# Patient Record
Sex: MALE | Race: WHITE | Employment: OTHER | URBAN - NONMETROPOLITAN AREA
[De-identification: names, ages, dates, MRNs, and addresses within clinical notes are randomized per-mention and may not be internally consistent; named-entity substitution may affect disease eponyms.]

---

## 2017-06-14 ENCOUNTER — OFFICE VISIT (OUTPATIENT)
Dept: CARDIOLOGY CLINIC | Age: 82
End: 2017-06-14

## 2017-06-14 VITALS
HEART RATE: 80 BPM | OXYGEN SATURATION: 97 % | DIASTOLIC BLOOD PRESSURE: 82 MMHG | SYSTOLIC BLOOD PRESSURE: 114 MMHG | WEIGHT: 169 LBS | BODY MASS INDEX: 25.03 KG/M2 | HEIGHT: 69 IN

## 2017-06-14 DIAGNOSIS — I48.19 PERSISTENT ATRIAL FIBRILLATION (HCC): ICD-10-CM

## 2017-06-14 DIAGNOSIS — E78.2 MIXED HYPERLIPIDEMIA: Primary | ICD-10-CM

## 2017-06-14 PROCEDURE — 99214 OFFICE O/P EST MOD 30 MIN: CPT | Performed by: INTERNAL MEDICINE

## 2017-08-30 ENCOUNTER — OFFICE VISIT (OUTPATIENT)
Dept: FAMILY MEDICINE CLINIC | Age: 82
End: 2017-08-30

## 2017-08-30 VITALS
DIASTOLIC BLOOD PRESSURE: 66 MMHG | HEIGHT: 65 IN | OXYGEN SATURATION: 97 % | HEART RATE: 60 BPM | WEIGHT: 173.2 LBS | BODY MASS INDEX: 28.86 KG/M2 | SYSTOLIC BLOOD PRESSURE: 110 MMHG

## 2017-08-30 DIAGNOSIS — R73.9 HYPERGLYCEMIA: ICD-10-CM

## 2017-08-30 DIAGNOSIS — R31.9 HEMATURIA: ICD-10-CM

## 2017-08-30 DIAGNOSIS — Z23 NEED FOR PROPHYLACTIC VACCINATION AND INOCULATION AGAINST VARICELLA: ICD-10-CM

## 2017-08-30 DIAGNOSIS — I48.20 CHRONIC ATRIAL FIBRILLATION (HCC): ICD-10-CM

## 2017-08-30 DIAGNOSIS — E78.2 MIXED HYPERLIPIDEMIA: ICD-10-CM

## 2017-08-30 DIAGNOSIS — R82.998 DARK URINE: ICD-10-CM

## 2017-08-30 DIAGNOSIS — I87.2 VENOUS INSUFFICIENCY: ICD-10-CM

## 2017-08-30 DIAGNOSIS — C61 PROSTATE CANCER (HCC): ICD-10-CM

## 2017-08-30 DIAGNOSIS — R60.0 EDEMA OF BOTH LEGS: Primary | ICD-10-CM

## 2017-08-30 DIAGNOSIS — Z23 NEED FOR INFLUENZA VACCINATION: ICD-10-CM

## 2017-08-30 DIAGNOSIS — T14.8XXA BRUISING: ICD-10-CM

## 2017-08-30 DIAGNOSIS — Z23 NEED FOR PROPHYLACTIC VACCINATION AGAINST DIPHTHERIA-TETANUS-PERTUSSIS (DTP): ICD-10-CM

## 2017-08-30 DIAGNOSIS — Z23 NEED FOR PROPHYLACTIC VACCINATION AGAINST STREPTOCOCCUS PNEUMONIAE (PNEUMOCOCCUS): ICD-10-CM

## 2017-08-30 DIAGNOSIS — E55.9 VITAMIN D DEFICIENCY: ICD-10-CM

## 2017-08-30 LAB
A/G RATIO: 1.7 (ref 1.1–2.2)
ALBUMIN SERPL-MCNC: 4.1 G/DL (ref 3.4–5)
ALP BLD-CCNC: 55 U/L (ref 40–129)
ALT SERPL-CCNC: 13 U/L (ref 10–40)
ANION GAP SERPL CALCULATED.3IONS-SCNC: 13 MMOL/L (ref 3–16)
AST SERPL-CCNC: 20 U/L (ref 15–37)
BASOPHILS ABSOLUTE: 0 K/UL (ref 0–0.2)
BASOPHILS RELATIVE PERCENT: 0.7 %
BILIRUB SERPL-MCNC: 0.7 MG/DL (ref 0–1)
BILIRUBIN, POC: NEGATIVE
BLOOD URINE, POC: NORMAL
BUN BLDV-MCNC: 18 MG/DL (ref 7–20)
CALCIUM SERPL-MCNC: 9.6 MG/DL (ref 8.3–10.6)
CHLORIDE BLD-SCNC: 102 MMOL/L (ref 99–110)
CLARITY, POC: CLEAR
CO2: 27 MMOL/L (ref 21–32)
COLOR, POC: YELLOW
CREAT SERPL-MCNC: 0.9 MG/DL (ref 0.8–1.3)
EOSINOPHILS ABSOLUTE: 0.2 K/UL (ref 0–0.6)
EOSINOPHILS RELATIVE PERCENT: 3.1 %
GFR AFRICAN AMERICAN: >60
GFR NON-AFRICAN AMERICAN: >60
GLOBULIN: 2.4 G/DL
GLUCOSE BLD-MCNC: 94 MG/DL (ref 70–99)
GLUCOSE URINE, POC: NEGATIVE
HCT VFR BLD CALC: 37.6 % (ref 40.5–52.5)
HEMOGLOBIN: 12.7 G/DL (ref 13.5–17.5)
KETONES, POC: NEGATIVE
LEUKOCYTE EST, POC: NEGATIVE
LYMPHOCYTES ABSOLUTE: 1.8 K/UL (ref 1–5.1)
LYMPHOCYTES RELATIVE PERCENT: 27.5 %
MCH RBC QN AUTO: 31.3 PG (ref 26–34)
MCHC RBC AUTO-ENTMCNC: 33.8 G/DL (ref 31–36)
MCV RBC AUTO: 92.6 FL (ref 80–100)
MONOCYTES ABSOLUTE: 0.5 K/UL (ref 0–1.3)
MONOCYTES RELATIVE PERCENT: 7.2 %
NEUTROPHILS ABSOLUTE: 3.9 K/UL (ref 1.7–7.7)
NEUTROPHILS RELATIVE PERCENT: 61.5 %
NITRITE, POC: NEGATIVE
PDW BLD-RTO: 14.2 % (ref 12.4–15.4)
PH, POC: 7
PLATELET # BLD: 175 K/UL (ref 135–450)
PMV BLD AUTO: 8 FL (ref 5–10.5)
POTASSIUM SERPL-SCNC: 4.1 MMOL/L (ref 3.5–5.1)
PROTEIN, POC: NEGATIVE
RBC # BLD: 4.06 M/UL (ref 4.2–5.9)
SODIUM BLD-SCNC: 142 MMOL/L (ref 136–145)
SPECIFIC GRAVITY, POC: 1.02
TOTAL PROTEIN: 6.5 G/DL (ref 6.4–8.2)
UROBILINOGEN, POC: 1
VITAMIN D 25-HYDROXY: 30 NG/ML
WBC # BLD: 6.4 K/UL (ref 4–11)

## 2017-08-30 PROCEDURE — 3288F FALL RISK ASSESSMENT DOCD: CPT | Performed by: FAMILY MEDICINE

## 2017-08-30 PROCEDURE — 99214 OFFICE O/P EST MOD 30 MIN: CPT | Performed by: FAMILY MEDICINE

## 2017-08-30 PROCEDURE — 36415 COLL VENOUS BLD VENIPUNCTURE: CPT | Performed by: FAMILY MEDICINE

## 2017-08-30 PROCEDURE — 81002 URINALYSIS NONAUTO W/O SCOPE: CPT | Performed by: FAMILY MEDICINE

## 2017-08-30 PROCEDURE — G8510 SCR DEP NEG, NO PLAN REQD: HCPCS | Performed by: FAMILY MEDICINE

## 2017-08-30 RX ORDER — DORZOLAMIDE HCL 20 MG/ML
SOLUTION/ DROPS OPHTHALMIC
COMMUNITY
Start: 2017-08-27 | End: 2018-08-30

## 2017-08-30 RX ORDER — LATANOPROST 50 UG/ML
SOLUTION/ DROPS OPHTHALMIC
COMMUNITY
Start: 2017-08-08 | End: 2018-08-30

## 2017-08-30 ASSESSMENT — PATIENT HEALTH QUESTIONNAIRE - PHQ9
SUM OF ALL RESPONSES TO PHQ9 QUESTIONS 1 & 2: 0
2. FEELING DOWN, DEPRESSED OR HOPELESS: 0
1. LITTLE INTEREST OR PLEASURE IN DOING THINGS: 0
SUM OF ALL RESPONSES TO PHQ QUESTIONS 1-9: 0

## 2017-08-31 LAB
ESTIMATED AVERAGE GLUCOSE: 111.2 MG/DL
HBA1C MFR BLD: 5.5 %

## 2017-09-01 LAB — URINE CULTURE, ROUTINE: NORMAL

## 2018-05-30 ENCOUNTER — TELEPHONE (OUTPATIENT)
Dept: FAMILY MEDICINE CLINIC | Age: 83
End: 2018-05-30

## 2018-06-27 ENCOUNTER — TELEPHONE (OUTPATIENT)
Dept: FAMILY MEDICINE CLINIC | Age: 83
End: 2018-06-27

## 2018-07-05 ENCOUNTER — OFFICE VISIT (OUTPATIENT)
Dept: FAMILY MEDICINE CLINIC | Age: 83
End: 2018-07-05

## 2018-07-05 VITALS
OXYGEN SATURATION: 96 % | SYSTOLIC BLOOD PRESSURE: 130 MMHG | BODY MASS INDEX: 27.49 KG/M2 | HEIGHT: 65 IN | DIASTOLIC BLOOD PRESSURE: 74 MMHG | WEIGHT: 165 LBS | HEART RATE: 108 BPM

## 2018-07-05 DIAGNOSIS — I48.20 CHRONIC ATRIAL FIBRILLATION (HCC): ICD-10-CM

## 2018-07-05 DIAGNOSIS — H40.9 GLAUCOMA OF LEFT EYE, UNSPECIFIED GLAUCOMA TYPE: ICD-10-CM

## 2018-07-05 DIAGNOSIS — Z01.818 PREOPERATIVE EXAMINATION: Primary | ICD-10-CM

## 2018-07-05 PROCEDURE — 99213 OFFICE O/P EST LOW 20 MIN: CPT | Performed by: NURSE PRACTITIONER

## 2018-07-05 RX ORDER — NETARSUDIL 0.2 MG/ML
SOLUTION/ DROPS OPHTHALMIC; TOPICAL
COMMUNITY
Start: 2018-05-31 | End: 2018-08-30

## 2018-07-05 NOTE — PROGRESS NOTES
[address]                             Preoperative Evaluation        Genesis Pillai  YOB: 1925    Date of Service:  7/5/2018    Vitals:    07/05/18 1520   BP: 130/74   Site: Left Arm   Position: Sitting   Cuff Size: Medium Adult   Pulse: 108   SpO2: 96%   Weight: 165 lb (74.8 kg)   Height: 5' 4.5\" (1.638 m)      Wt Readings from Last 2 Encounters:   07/05/18 165 lb (74.8 kg)   08/30/17 173 lb 3.2 oz (78.6 kg)     BP Readings from Last 3 Encounters:   07/05/18 130/74   08/30/17 110/66   06/14/17 114/82        Chief Complaint   Patient presents with   Bertha Morrow Pre-op Exam     Bleb Needling with Mitomycin of left eye for glaucoma, scheduled with Dr. Ninoska Whelan at [facility] on July 24, 2018     Allergies   Allergen Reactions    Demerol Swelling     Outpatient Prescriptions Marked as Taking for the 7/5/18 encounter (Office Visit) with PEPE Rehman - CNP   Medication Sig Dispense Refill    RHOPRESSA 0.02 % SOLN       dorzolamide (TRUSOPT) 2 % ophthalmic solution       latanoprost (XALATAN) 0.005 % ophthalmic solution       vitamin D (CHOLECALCIFEROL) 1000 UNIT TABS tablet Take 1,000 Units by mouth daily      Multiple Vitamins-Minerals (EYE VITAMINS PO) Take  by mouth daily.  Fish Oil-Lutein-D-Zeaxanthin (EYE OMEGA ADVANTAGE/VIT D-3 PO) Take  by mouth daily. This patient presents to the office today for a preoperative consultation at the request of surgeon, Dr. Shannon Sr, who plans on performing bleb needling with mitomycin of the left eye on July 24 at Ambulatory Surgery at [facility] in [facility]. The current problem began several years ago, and symptoms have been unchanged with time. Conservative therapy: Yes: monitoring, eye drops, which has been not very effective. .    Planned anesthesia: Regional   Known anesthesia problems: None   Bleeding risk: Aspirin 81 mg daily  Personal or FH of DVT/PE: No      Patient Active Problem List   Diagnosis    [facility]    Glaucoma    Arthritis    Prostate cancer ([facility]    [place] (hard of hearing)    Venous insufficiency    Hyperglycemia    Left-sided low back pain with left-sided sciatica    Edema of both legs    Chronic atrial fibrillation (HCC)       Past Medical History:   Diagnosis Date    Arthritis     Atrial fibrillation (HCC)     CVA (cerebral vascular accident) ([facility]    Fifi Boga Glaucoma     [place] (hard of hearing)     Hyperglycemia     Hyperlipidemia     Macular degeneration     left eye    Prostate cancer ([facility] 2007    Venous insufficiency      Past Surgical History:   Procedure Laterality Date    CHOLECYSTECTOMY      HIP ARTHROPLASTY      bilateral     Family History   Problem Relation Age of Onset    Cancer Mother     Cancer Father      Social History     Social History    Marital status:      Spouse name: N/A    Number of children: N/A    Years of education: N/A     Occupational History    Not on file. Social History Main Topics    Smoking status: Never Smoker    Smokeless tobacco: Never Used    Alcohol use No    Drug use: No    Sexual activity: No     Other Topics Concern    Not on file     Social History Narrative    No narrative on file       Review of Systems  A comprehensive review of systems was negative except for what was noted in the HPI. Physical Exam   Constitutional: He is oriented to person, place, and time. He appears well-developed and well-nourished. No distress. HENT:   Head: Normocephalic and atraumatic. Mouth/Throat: Uvula is midline, oropharynx is clear and moist and mucous membranes are normal.   Eyes: Patient is blind in right eye. Neck: Trachea normal and normal range of motion. Neck supple. No JVD present. Carotid bruit is not present. No mass and no thyromegaly present.    Cardiovascular: Normal rate, irregular rhythm, normal heart sounds and intact distal pulses. Exam reveals no gallop and no friction rub. 2/6 systolic murmur heard. Pulmonary/Chest: Effort normal and breath sounds normal. No respiratory distress. He has no wheezes. He has no rales. Abdominal: Soft. Normal aorta and bowel sounds are normal. He exhibits no distension and no mass. There is no hepatosplenomegaly. No tenderness. Musculoskeletal: He exhibits no edema and no tenderness. Neurological: He is alert and oriented to person, place, and time. He has normal strength. No cranial nerve deficit or sensory deficit. Coordination and gait normal.   Skin: Skin is warm and dry. No rash noted. No erythema. Psychiatric: He has a normal mood and affect. His behavior is normal.      Assessment:       80 y.o. patient with planned surgery as above. Known risk factors for perioperative complications: chronic atrial fibrillation, advanced age  Current medications which may produce withdrawal symptoms if withheld perioperatively: none     1. Preoperative examination    2. Glaucoma of left eye, unspecified glaucoma type         Plan:     1. Preoperative workup as follows: none  2. Change in medication regimen before surgery: Discontinue ASA 7 days before surgery  3. Prophylaxis for cardiac events with perioperative beta-blockers: Not indicated  ACC/AHA indications for pre-operative beta-blocker use:    · Vascular surgery with history of postitive stress test  · Intermediate or high risk surgery with history of CAD   · Intermediate or high risk surgery with multiple clinical predictors of CAD- 2 of the following: history of compensated or prior heart failure, history of cerebrovascular disease, DM, or renal insufficiency    Routine administration of higher-dose, long-acting metoprolol in beta-blockernaïve patients on the day of surgery, and in the absence of dose titration is associated with an overall increase in mortality.   Beta-blockers should be started days to weeks prior to surgery and titrated to pulse < 70.  4. Deep vein thrombosis prophylaxis: regimen to be chosen by surgical team  5. No contraindications to planned surgery      If you have questions, please do not hesitate to call me ([phone]).      Sincerely,                Henry Javier, CNP

## 2018-08-30 ENCOUNTER — HOSPITAL ENCOUNTER (EMERGENCY)
Age: 83
Discharge: HOME | End: 2018-08-30
Attending: EMERGENCY MEDICINE
Payer: MEDICARE

## 2018-08-30 ENCOUNTER — APPOINTMENT (OUTPATIENT)
Dept: GENERAL RADIOLOGY | Age: 83
End: 2018-08-30
Payer: MEDICARE

## 2018-08-30 ENCOUNTER — APPOINTMENT (OUTPATIENT)
Dept: CT IMAGING | Age: 83
End: 2018-08-30
Payer: MEDICARE

## 2018-08-30 VITALS
HEIGHT: 68 IN | OXYGEN SATURATION: 96 % | BODY MASS INDEX: 25.46 KG/M2 | TEMPERATURE: 97.6 F | DIASTOLIC BLOOD PRESSURE: 88 MMHG | WEIGHT: 168 LBS | RESPIRATION RATE: 20 BRPM | SYSTOLIC BLOOD PRESSURE: 129 MMHG | HEART RATE: 91 BPM

## 2018-08-30 DIAGNOSIS — S70.02XA CONTUSION OF LEFT HIP, INITIAL ENCOUNTER: ICD-10-CM
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
## 2018-08-30 DIAGNOSIS — S42.92XA SHOULDER FRACTURE, LEFT, CLOSED, INITIAL ENCOUNTER: Primary | ICD-10-CM

## 2018-08-30 DIAGNOSIS — S09.90XA INJURY OF HEAD, INITIAL ENCOUNTER: ICD-10-CM

## 2018-08-30 PROCEDURE — 73030 X-RAY EXAM OF SHOULDER: CPT

## 2018-08-30 PROCEDURE — 70450 CT HEAD/BRAIN W/O DYE: CPT

## 2018-08-30 PROCEDURE — 99284 EMERGENCY DEPT VISIT MOD MDM: CPT

## 2018-08-30 PROCEDURE — 6370000000 HC RX 637 (ALT 250 FOR IP): Performed by: PHYSICIAN ASSISTANT

## 2018-08-30 PROCEDURE — 73501 X-RAY EXAM HIP UNI 1 VIEW: CPT

## 2018-08-30 PROCEDURE — 72125 CT NECK SPINE W/O DYE: CPT

## 2018-08-30 RX ORDER — ACETAMINOPHEN 325 MG/1
650 TABLET ORAL ONCE
Status: COMPLETED | OUTPATIENT
Start: 2018-08-30 | End: 2018-08-30

## 2018-08-30 RX ORDER — PREDNISOLONE ACETATE 10 MG/ML
1 SUSPENSION/ DROPS OPHTHALMIC 4 TIMES DAILY
COMMUNITY
End: 2019-07-02 | Stop reason: ALTCHOICE

## 2018-08-30 RX ORDER — HYDROCODONE BITARTRATE AND ACETAMINOPHEN 5; 325 MG/1; MG/1
0.5 TABLET ORAL EVERY 8 HOURS PRN
Qty: 8 TABLET | Refills: 0 | Status: SHIPPED | OUTPATIENT
Start: 2018-08-30 | End: 2018-09-06

## 2018-08-30 RX ADMIN — ACETAMINOPHEN 650 MG: 325 TABLET ORAL at 20:42

## 2018-08-30 ASSESSMENT — PAIN DESCRIPTION - ORIENTATION: ORIENTATION: LEFT

## 2018-08-30 ASSESSMENT — PAIN SCALES - GENERAL
PAINLEVEL_OUTOF10: 5
PAINLEVEL_OUTOF10: 5

## 2018-08-30 ASSESSMENT — PAIN DESCRIPTION - LOCATION: LOCATION: SHOULDER

## 2018-08-31 NOTE — ED PROVIDER NOTES
bilateral hip replacements without evidence of hardware   complication. No acute fracture of the bony pelvis. Generalized osteopenia. CT Cervical Spine WO Contrast   Final Result   Widening of the disc space at C3-C4, greater than expected. Although this   could be secondary to chronic degenerative disease with hypermobility,   ligamentous injury would be difficult to exclude. Findings were discussed with Dr. Shital Stockton At 10:07 pm on 8/30/2018. XR SHOULDER LEFT (MIN 2 VIEWS)   Final Result   Mildly impacted surgical neck fracture. CT Head WO Contrast   Final Result   No acute intracranial abnormality. No results found. PROCEDURES   Unless otherwise noted below, none     Procedures    CRITICAL CARE TIME   N/A    CONSULTS:  None      EMERGENCY DEPARTMENT COURSE and DIFFERENTIAL DIAGNOSIS/MDM:   Vitals:    Vitals:    08/30/18 1910   BP: (!) 142/88   Pulse: 72   Resp: 16   Temp: 97.6 °F (36.4 °C)   TempSrc: Temporal   SpO2: 96%   Weight: 168 lb (76.2 kg)   Height: 5' 8\" (1.727 m)       Patient was given the following medications:  Medications   acetaminophen (TYLENOL) tablet 650 mg (650 mg Oral Given 8/30/18 2042)       Patient presented with history of slip and fall landing on the left side. CT of head negative. CT of neck shows subtle widening at the C3-C4 area. Negative clinically. Neurologically intact. Shoulder reveals pain on exam.  Limited range of motion due to pain. X-ray shows a subtle impacted fracture at this area. Sling and swath applied. Patient given Tylenol 650 mg in the emergency room. He will be discharged with continued use of Tylenol as primary means of pain control. He may add hydrocodone 5 mg one half tablet every 8 hours as needed for additional pain control. Recommend sleep in a recliner. Recommended ice application. He is to contact orthopedic specialist Dr. Adelita Dimas tomorrow for an appointment on Tuesday for follow-up visit.   He is aware to return to this facility if symptoms worsen. The patient and family members to express understanding of the diagnosis and treatment plan. I did review the case with attending physician who personally evaluated the patient. The patient tolerated their visit well. They were seen and evaluated by the attending physician who agreed with the assessment and plan. The patient and / or the family were informed of the results of any tests, a time was given to answer questions, a plan was proposed and they agreed with plan. FINAL IMPRESSION      1. Shoulder fracture, left, closed, initial encounter    2. Contusion of left hip, initial encounter    3. Injury of head, initial encounter    4. Fall on same level from slipping, tripping or stumbling, initial encounter          DISPOSITION/PLAN   DISPOSITION        PATIENT REFERRED TO:  Peggy Alfaro MD  [address] [place] [address]  [phone]    Schedule an appointment as soon as possible for a visit in 4 days      Nessa Williamson MD  [address] [facility]. [facility]  [phone]    Schedule an appointment as soon as possible for a visit   As needed    [facility]  [address]  [address] [facility]  Go to   As needed      DISCHARGE MEDICATIONS:  New Prescriptions    HYDROCODONE-ACETAMINOPHEN (NORCO) 5-325 MG PER TABLET    Take 0.5 tablets by mouth every 8 hours as needed for Pain for up to 7 days. Geraldo England DISCONTINUED MEDICATIONS:  Discontinued Medications    ASPIRIN EC 81 MG EC TABLET    Take 1 tablet by mouth daily    DORZOLAMIDE (TRUSOPT) 2 % OPHTHALMIC SOLUTION        FISH OIL-LUTEIN-D-ZEAXANTHIN (EYE OMEGA ADVANTAGE/VIT D-3 PO)    Take  by mouth daily.     LATANOPROST (XALATAN) 0.005 % OPHTHALMIC SOLUTION        RHOPRESSA 0.02 % SOLN        VITAMIN D (CHOLECALCIFEROL) 1000 UNIT TABS TABLET    Take 1,000 Units by mouth daily         Attestation: The Prescription Monitoring Report for this patient was reviewed today. Maria D Robert PA-C)  Documentation: Obtaining appropriate analgesic effect of treatment., No signs of potential drug abuse or diversion identified. Maria D Robert PA-C)    (Please note that portions of this note were completed with a voice recognition program.  Efforts were made to edit the dictations but occasionally words are mis-transcribed. )    Maria D Robert PA-C (electronically signed)            Maria D Robert PA-C  08/30/18 5414

## 2018-09-04 ENCOUNTER — OFFICE VISIT (OUTPATIENT)
Dept: ORTHOPEDIC SURGERY | Age: 83
End: 2018-09-04

## 2018-09-04 VITALS
SYSTOLIC BLOOD PRESSURE: 136 MMHG | WEIGHT: 167.99 LBS | BODY MASS INDEX: 25.46 KG/M2 | DIASTOLIC BLOOD PRESSURE: 86 MMHG | HEIGHT: 68 IN | HEART RATE: 64 BPM

## 2018-09-04 DIAGNOSIS — S42.212A FX HUMERAL NECK, LEFT, CLOSED, INITIAL ENCOUNTER: Primary | ICD-10-CM

## 2018-09-04 DIAGNOSIS — M25.512 ACUTE PAIN OF LEFT SHOULDER: ICD-10-CM

## 2018-09-04 PROCEDURE — 99213 OFFICE O/P EST LOW 20 MIN: CPT | Performed by: ORTHOPAEDIC SURGERY

## 2018-09-04 PROCEDURE — 23600 CLTX PROX HUMRL FX W/O MNPJ: CPT | Performed by: ORTHOPAEDIC SURGERY

## 2018-09-04 PROCEDURE — L3660 SO 8 AB RSTR CAN/WEB PRE OTS: HCPCS | Performed by: ORTHOPAEDIC SURGERY

## 2018-09-04 NOTE — PROGRESS NOTES
Concern    Not on file     Social History Narrative    No narrative on file       Family HISTORY    Family History   Problem Relation Age of Onset    Cancer Mother     Cancer Father        PHYSICAL EXAM    Vital Signs:  /86   Pulse 64   Ht 5' 7.99\" (1.727 m)   Wt 167 lb 15.9 oz (76.2 kg)   BMI 25.55 kg/m²   General Appearance:  Normal body habitus. Alert and oriented to person, place, and time. Affect:  Normal.   Skin:  Intact. Sensation:  Intact. Strength:  Intact. Reflexes:  Intact. Pulses:  Intact. Shoulder Exam  He has a full range of motion of the neck. Examination of the shoulder reveals:  He has limited range of motion secondary to pain. He has ecchymosis around the arm. His neurocirculatory lymphatic exam otherwise is normal symmetric to both upper extremities. He has good cervical range of motion with negative Spurling's maneuver. He has no tenderness over the proximal biceps. He has a full active range of motion of the elbow, wrist and hand. Range of motion  (in degrees)   Right Left   ABDUCTION       EXT. ROTATION       INT. ROTATION       FORWARD FLEX    STRENGTH         Provocative Test Positive Negative Not indicated   Spurling Sign [] [x] []   Cross Arm Adduction Test [] [] [x]   Apprehension Sign [] [] [x]   Neer Sign [] [] [x]   Mistry Impingement Sign [] [] [x]   Yergason test [] [] []   OSimones test [] [] []     Other Provocative tests:     IMAGING STUDIES    X-rays 3 views of his left shoulder were reviewed and reveals a minimally impacted fracture of the surgical neck of the humeral head    IMPRESSION    Left humeral neck fracture, impacted    PLAN    1. Conservative care options including bracing, and medicines were discussed. 2.  The indications for therapeutic injections were discussed. 3.  The indications for additional imaging studies were discussed.    4.  After considering the various options discussed, the patient elected to pursue a course

## 2018-09-19 ENCOUNTER — OFFICE VISIT (OUTPATIENT)
Dept: ORTHOPEDIC SURGERY | Age: 83
End: 2018-09-19

## 2018-09-19 VITALS
WEIGHT: 164 LBS | BODY MASS INDEX: 24.86 KG/M2 | HEIGHT: 68 IN | SYSTOLIC BLOOD PRESSURE: 142 MMHG | DIASTOLIC BLOOD PRESSURE: 87 MMHG | HEART RATE: 62 BPM

## 2018-09-19 DIAGNOSIS — M25.512 ACUTE PAIN OF LEFT SHOULDER: Primary | ICD-10-CM

## 2018-09-19 DIAGNOSIS — S42.225D CLOSED 2-PART NONDISPLACED FRACTURE OF SURGICAL NECK OF LEFT HUMERUS WITH ROUTINE HEALING: ICD-10-CM

## 2018-09-19 PROCEDURE — 99024 POSTOP FOLLOW-UP VISIT: CPT | Performed by: ORTHOPAEDIC SURGERY

## 2018-09-19 NOTE — PROGRESS NOTES
FOLLOW-UP VISIT      The patient returns for follow-up s/p left humeral neck fracture    Date of injury   8/30/18    Exam    He is doing and feeling much better at this time. X-rays 2 views of his left shoulder demonstrate a nondisplaced fracture 2 part of the humeral neck. Plan    Protected activity for 3 weeks at which point he should return for x-ray left shoulder.     Follow-up Appointment    3 weeks for x-ray left shoulder

## 2018-10-10 ENCOUNTER — OFFICE VISIT (OUTPATIENT)
Dept: ORTHOPEDIC SURGERY | Age: 83
End: 2018-10-10

## 2018-10-10 VITALS — HEIGHT: 68 IN | BODY MASS INDEX: 24.86 KG/M2 | WEIGHT: 164.02 LBS

## 2018-10-10 DIAGNOSIS — S42.222D CLOSED 2-PART DISPLACED FRACTURE OF SURGICAL NECK OF LEFT HUMERUS WITH ROUTINE HEALING, SUBSEQUENT ENCOUNTER: Primary | ICD-10-CM

## 2018-10-10 DIAGNOSIS — M25.512 ACUTE PAIN OF LEFT SHOULDER: ICD-10-CM

## 2018-10-10 PROCEDURE — 99024 POSTOP FOLLOW-UP VISIT: CPT | Performed by: ORTHOPAEDIC SURGERY

## 2019-07-02 ENCOUNTER — OFFICE VISIT (OUTPATIENT)
Dept: FAMILY MEDICINE CLINIC | Age: 84
End: 2019-07-02
Payer: MEDICARE

## 2019-07-02 VITALS
SYSTOLIC BLOOD PRESSURE: 138 MMHG | HEIGHT: 68 IN | BODY MASS INDEX: 26.52 KG/M2 | HEART RATE: 65 BPM | DIASTOLIC BLOOD PRESSURE: 72 MMHG | OXYGEN SATURATION: 97 % | WEIGHT: 175 LBS

## 2019-07-02 DIAGNOSIS — R73.9 HYPERGLYCEMIA: ICD-10-CM

## 2019-07-02 DIAGNOSIS — Z00.00 ROUTINE GENERAL MEDICAL EXAMINATION AT A HEALTH CARE FACILITY: ICD-10-CM

## 2019-07-02 DIAGNOSIS — Z13.220 LIPID SCREENING: ICD-10-CM

## 2019-07-02 DIAGNOSIS — C61 PROSTATE CANCER (HCC): ICD-10-CM

## 2019-07-02 DIAGNOSIS — I87.2 VENOUS INSUFFICIENCY: ICD-10-CM

## 2019-07-02 DIAGNOSIS — Z85.46 PERSONAL HISTORY OF PROSTATE CANCER: ICD-10-CM

## 2019-07-02 DIAGNOSIS — Z00.00 MEDICARE ANNUAL WELLNESS VISIT, SUBSEQUENT: Primary | ICD-10-CM

## 2019-07-02 DIAGNOSIS — I48.20 CHRONIC ATRIAL FIBRILLATION (HCC): ICD-10-CM

## 2019-07-02 PROCEDURE — G0438 PPPS, INITIAL VISIT: HCPCS | Performed by: FAMILY MEDICINE

## 2019-07-02 RX ORDER — ACETAMINOPHEN 500 MG
500 TABLET ORAL NIGHTLY
Status: ON HOLD | COMMUNITY
End: 2021-05-15 | Stop reason: HOSPADM

## 2019-07-02 RX ORDER — LORATADINE 10 MG/1
10 TABLET ORAL DAILY
COMMUNITY
End: 2019-09-18 | Stop reason: ALTCHOICE

## 2019-07-02 ASSESSMENT — LIFESTYLE VARIABLES
HOW OFTEN DO YOU HAVE A DRINK CONTAINING ALCOHOL: 1
HOW OFTEN DO YOU HAVE SIX OR MORE DRINKS ON ONE OCCASION: 0
HOW MANY STANDARD DRINKS CONTAINING ALCOHOL DO YOU HAVE ON A TYPICAL DAY: 0
AUDIT-C TOTAL SCORE: 1

## 2019-07-02 ASSESSMENT — ANXIETY QUESTIONNAIRES: GAD7 TOTAL SCORE: 1

## 2019-07-02 ASSESSMENT — PATIENT HEALTH QUESTIONNAIRE - PHQ9
SUM OF ALL RESPONSES TO PHQ QUESTIONS 1-9: 0
SUM OF ALL RESPONSES TO PHQ QUESTIONS 1-9: 0

## 2019-07-02 NOTE — PATIENT INSTRUCTIONS
FREE \"Exercise & Physical Activity: Your Everyday Guide\" from [employer]. Call 2-417.743.6187 or search [employer] online. · You need 8873-0263 mg of calcium and 1930-7815 IU of vitamin D per day. It is possible to meet your calcium requirement with diet alone, but a vitamin D supplement is usually necessary to meet this goal.  · When exposed to the sun, use a sunscreen that protects against both UVA and UVB radiation with an SPF of 30 or greater. Reapply every 2 to 3 hours or after sweating, drying off with a towel, or swimming. · Always wear a seat belt when traveling in a car. Always wear a helmet when riding a bicycle or motorcycle.

## 2019-07-03 ENCOUNTER — NURSE ONLY (OUTPATIENT)
Dept: FAMILY MEDICINE CLINIC | Age: 84
End: 2019-07-03
Payer: MEDICARE

## 2019-07-03 DIAGNOSIS — R73.9 HYPERGLYCEMIA: ICD-10-CM

## 2019-07-03 DIAGNOSIS — Z13.220 LIPID SCREENING: ICD-10-CM

## 2019-07-03 DIAGNOSIS — I87.2 VENOUS INSUFFICIENCY: ICD-10-CM

## 2019-07-03 DIAGNOSIS — I48.20 CHRONIC ATRIAL FIBRILLATION (HCC): ICD-10-CM

## 2019-07-03 LAB
A/G RATIO: 2 (ref 1.1–2.2)
ALBUMIN SERPL-MCNC: 4.5 G/DL (ref 3.4–5)
ALP BLD-CCNC: 61 U/L (ref 40–129)
ALT SERPL-CCNC: 8 U/L (ref 10–40)
ANION GAP SERPL CALCULATED.3IONS-SCNC: 13 MMOL/L (ref 3–16)
AST SERPL-CCNC: 14 U/L (ref 15–37)
BASOPHILS ABSOLUTE: 0 K/UL (ref 0–0.2)
BASOPHILS RELATIVE PERCENT: 0.7 %
BILIRUB SERPL-MCNC: 0.6 MG/DL (ref 0–1)
BUN BLDV-MCNC: 16 MG/DL (ref 7–20)
CALCIUM SERPL-MCNC: 9.5 MG/DL (ref 8.3–10.6)
CHLORIDE BLD-SCNC: 104 MMOL/L (ref 99–110)
CHOLESTEROL, TOTAL: 179 MG/DL (ref 0–199)
CO2: 26 MMOL/L (ref 21–32)
CREAT SERPL-MCNC: 1 MG/DL (ref 0.8–1.3)
EOSINOPHILS ABSOLUTE: 0.2 K/UL (ref 0–0.6)
EOSINOPHILS RELATIVE PERCENT: 4.2 %
GFR AFRICAN AMERICAN: >60
GFR NON-AFRICAN AMERICAN: >60
GLOBULIN: 2.3 G/DL
GLUCOSE BLD-MCNC: 94 MG/DL (ref 70–99)
HCT VFR BLD CALC: 38.8 % (ref 40.5–52.5)
HDLC SERPL-MCNC: 63 MG/DL (ref 40–60)
HEMOGLOBIN: 13.1 G/DL (ref 13.5–17.5)
LDL CHOLESTEROL CALCULATED: 106 MG/DL
LYMPHOCYTES ABSOLUTE: 1.4 K/UL (ref 1–5.1)
LYMPHOCYTES RELATIVE PERCENT: 24.8 %
MCH RBC QN AUTO: 31.9 PG (ref 26–34)
MCHC RBC AUTO-ENTMCNC: 33.7 G/DL (ref 31–36)
MCV RBC AUTO: 94.6 FL (ref 80–100)
MONOCYTES ABSOLUTE: 0.4 K/UL (ref 0–1.3)
MONOCYTES RELATIVE PERCENT: 7.5 %
NEUTROPHILS ABSOLUTE: 3.6 K/UL (ref 1.7–7.7)
NEUTROPHILS RELATIVE PERCENT: 62.8 %
PDW BLD-RTO: 14.6 % (ref 12.4–15.4)
PLATELET # BLD: 190 K/UL (ref 135–450)
PMV BLD AUTO: 7.8 FL (ref 5–10.5)
POTASSIUM SERPL-SCNC: 4.1 MMOL/L (ref 3.5–5.1)
RBC # BLD: 4.1 M/UL (ref 4.2–5.9)
SODIUM BLD-SCNC: 143 MMOL/L (ref 136–145)
TOTAL PROTEIN: 6.8 G/DL (ref 6.4–8.2)
TRIGL SERPL-MCNC: 48 MG/DL (ref 0–150)
VLDLC SERPL CALC-MCNC: 10 MG/DL
WBC # BLD: 5.7 K/UL (ref 4–11)

## 2019-07-03 PROCEDURE — 36415 COLL VENOUS BLD VENIPUNCTURE: CPT | Performed by: FAMILY MEDICINE

## 2019-07-04 LAB
ESTIMATED AVERAGE GLUCOSE: 114 MG/DL
HBA1C MFR BLD: 5.6 %

## 2019-08-01 PROBLEM — Z00.00 MEDICARE ANNUAL WELLNESS VISIT, SUBSEQUENT: Status: RESOLVED | Noted: 2019-07-02 | Resolved: 2019-08-01

## 2019-09-06 ENCOUNTER — CARE COORDINATION (OUTPATIENT)
Dept: CARE COORDINATION | Age: 84
End: 2019-09-06

## 2019-09-18 ENCOUNTER — CARE COORDINATION (OUTPATIENT)
Dept: CARE COORDINATION | Age: 84
End: 2019-09-18

## 2019-09-18 NOTE — CARE COORDINATION
Ambulatory Care Coordination Note  9/18/2019  CM Risk Score: 0  Charlson 10 Year Mortality Risk Score: 100%     ACC: Radha Baptiste RN    Summary Note: ACM spoke with patient for care coordination. He is doing very well with his age. Poor vision and hearing (macular degeneration). Uses some magnifiers. No recent falls. Uses cane or walker. Fall safety discussed. Lives with his daughter now and no longer drives. He has been having issues with a chronic dry, hacking cough that wont go away. PCP follow up tomorrow. He thinks it is because he does not drink enough. Stated he drinks about 3 glasses of water a day and some coffee. Patient has a history or CVA and was not able to take xarelto or pradaxa per the patient. Nose bleeds and hemorrhagic eye problem. Advised to take baby asa daily but has since stopped that. Stated he feels fine with out it. Attempted to educate on atrial fibrillation and anticoagulant. Patient does not seem interested in this therapy. No plans for cardiac follow up as well. Agreeable to ongoing care coordination calls. Patient denies any need for additional home supports at this time. Feels like he an his daughter are managing. Past Medical History:   Diagnosis Date    Arthritis     Atrial fibrillation ([address]     CVA (cerebral vascular accident) ([address]    [facility] Glaucoma     [place] (hard of hearing)     Hyperglycemia     Hyperlipidemia     Macular degeneration     left eye    Prostate cancer ([facility] 2007    Venous insufficiency      Plan     Plan for short term care coordination. Low needs identfied  fall safety  Discuss bathroom safety  Follow up on cough symptoms   Assess for needs for additional needs for equipment related to low vision and hearing.      Ambulatory Care Coordination Assessment    Care Coordination Protocol  Program Enrollment:  Rising Risk  Referral from Primary Care Provider:  No  Week 1 - Initial Assessment     Do you have all of your prescriptions and are they 9:00 AM PEPE Pascual CNP, [facility]

## 2019-09-18 NOTE — PATIENT INSTRUCTIONS
spaces. ? Walk with a partner. ? Watch out for cracked sidewalks, curbs, changes in the height of the pavement, exposed tree roots, and debris such as fallen leaves or branches. Where can you learn more? Go to https://gerard.healthTipp24. org and sign in to your [employer] account. Enter L631 in the Showcase-[employer] box to learn more about \"Preventing Outdoor Falls: Care Instructions. \"     If you do not have an account, please click on the \"Sign Up Now\" link. Current as of: November 7, 2018  Content Version: 12.1  © 4780-5810 [employer]. Care instructions adapted under license by [facility] ([facility]). If you have questions about a medical condition or this instruction, always ask your healthcare professional. [address] any warranty or liability for your use of this information. Patient Education        Preventing Falls: Care Instructions  Your Care Instructions    Getting around your home safely can be a challenge if you have injuries or health problems that make it easy for you to fall. Loose rugs and furniture in walkways are among the dangers for many older people who have problems walking or who have poor eyesight. People who have conditions such as arthritis, osteoporosis, or dementia also have to be careful not to fall. You can make your home safer with a few simple measures. Follow-up care is a key part of your treatment and safety. Be sure to make and go to all appointments, and call your doctor if you are having problems. It's also a good idea to know your test results and keep a list of the medicines you take. How can you care for yourself at home? Taking care of yourself  · You may get dizzy if you do not drink enough water. To prevent dehydration, drink plenty of fluids, enough so that your urine is light yellow or clear like water. Choose water and other caffeine-free clear liquids.  If you have kidney, heart, or liver disease and have to limit

## 2019-09-19 ENCOUNTER — OFFICE VISIT (OUTPATIENT)
Dept: FAMILY MEDICINE CLINIC | Age: 84
End: 2019-09-19
Payer: MEDICARE

## 2019-09-19 VITALS
SYSTOLIC BLOOD PRESSURE: 147 MMHG | DIASTOLIC BLOOD PRESSURE: 77 MMHG | OXYGEN SATURATION: 96 % | TEMPERATURE: 97.6 F | BODY MASS INDEX: 26.07 KG/M2 | WEIGHT: 172 LBS | HEART RATE: 79 BPM | HEIGHT: 68 IN

## 2019-09-19 DIAGNOSIS — R05.9 COUGH: Primary | ICD-10-CM

## 2019-09-19 DIAGNOSIS — H61.21 RIGHT EAR IMPACTED CERUMEN: ICD-10-CM

## 2019-09-19 DIAGNOSIS — Z23 NEED FOR INFLUENZA VACCINATION: ICD-10-CM

## 2019-09-19 DIAGNOSIS — Z91.09 ENVIRONMENTAL ALLERGIES: ICD-10-CM

## 2019-09-19 DIAGNOSIS — H65.92 MIDDLE EAR EFFUSION, LEFT: ICD-10-CM

## 2019-09-19 DIAGNOSIS — M25.512 ACUTE PAIN OF LEFT SHOULDER: ICD-10-CM

## 2019-09-19 PROCEDURE — G0008 ADMIN INFLUENZA VIRUS VAC: HCPCS | Performed by: NURSE PRACTITIONER

## 2019-09-19 PROCEDURE — 69210 REMOVE IMPACTED EAR WAX UNI: CPT | Performed by: NURSE PRACTITIONER

## 2019-09-19 PROCEDURE — 90686 IIV4 VACC NO PRSV 0.5 ML IM: CPT | Performed by: NURSE PRACTITIONER

## 2019-09-19 PROCEDURE — 99214 OFFICE O/P EST MOD 30 MIN: CPT | Performed by: NURSE PRACTITIONER

## 2019-09-19 RX ORDER — FLUTICASONE PROPIONATE 50 MCG
1 SPRAY, SUSPENSION (ML) NASAL DAILY
Qty: 1 BOTTLE | Refills: 3 | Status: SHIPPED | OUTPATIENT
Start: 2019-09-19 | End: 2020-09-23 | Stop reason: ALTCHOICE

## 2019-09-19 ASSESSMENT — ENCOUNTER SYMPTOMS
TROUBLE SWALLOWING: 0
FACIAL SWELLING: 0
SHORTNESS OF BREATH: 0
SORE THROAT: 0
GASTROINTESTINAL NEGATIVE: 1
COUGH: 1
VOICE CHANGE: 0
STRIDOR: 0
CHEST TIGHTNESS: 0
SINUS PAIN: 0
CHOKING: 0
WHEEZING: 0
RHINORRHEA: 1
APNEA: 0
SINUS PRESSURE: 0

## 2019-09-19 NOTE — PROGRESS NOTES
10/10/18 164 lb 0.4 oz (74.4 kg)     Temp Readings from Last 3 Encounters:   09/19/19 97.6 °F (36.4 °C)   08/30/18 97.6 °F (36.4 °C) (Temporal)   07/09/13 97.5 °F (36.4 °C)     BP Readings from Last 3 Encounters:   09/19/19 (!) 147/77   07/02/19 138/72   09/19/18 (!) 142/87     Pulse Readings from Last 3 Encounters:   09/19/19 79   07/02/19 65   09/19/18 62     Physical Exam   Constitutional: He is oriented to person, place, and time. He appears well-developed and well-nourished. No distress. HENT:   Head: Normocephalic and atraumatic. Right Ear: External ear normal.   Left Ear: External ear normal. A middle ear effusion is present. Ears:    Nose: Mucosal edema and rhinorrhea present. Right sinus exhibits no maxillary sinus tenderness and no frontal sinus tenderness. Left sinus exhibits no maxillary sinus tenderness and no frontal sinus tenderness. Mouth/Throat: Posterior oropharyngeal erythema (Postnasal drainage noted ) present. No oropharyngeal exudate. Eyes: Conjunctivae and EOM are normal. Right eye exhibits no discharge. Left eye exhibits no discharge. No scleral icterus. Neck: Normal range of motion. Neck supple. No tracheal deviation present. Cardiovascular: Normal rate and intact distal pulses. An irregularly irregular rhythm present. Exam reveals no gallop and no friction rub. Murmur heard. Systolic murmur is present with a grade of 4/6. Pulmonary/Chest: Effort normal and breath sounds normal. No stridor. No respiratory distress. He has no wheezes. He has no rales. He exhibits no tenderness. Abdominal: Soft. Bowel sounds are normal. He exhibits no distension and no mass. There is no tenderness. There is no rebound and no guarding. No hernia. Musculoskeletal: Normal range of motion. He exhibits no edema, tenderness or deformity. Lymphadenopathy:     He has no cervical adenopathy. Neurological: He is alert and oriented to person, place, and time. He has normal reflexes.  He

## 2019-10-08 ENCOUNTER — CARE COORDINATION (OUTPATIENT)
Dept: CARE COORDINATION | Age: 84
End: 2019-10-08

## 2019-10-31 ENCOUNTER — CARE COORDINATION (OUTPATIENT)
Dept: CARE COORDINATION | Age: 84
End: 2019-10-31

## 2020-01-07 ENCOUNTER — CARE COORDINATION (OUTPATIENT)
Dept: CARE COORDINATION | Age: 85
End: 2020-01-07

## 2020-01-07 NOTE — CARE COORDINATION
Ambulatory Care Coordination Note  1/7/2020  CM Risk Score: 0  Charlson 10 Year Mortality Risk Score: 100%     ACC: Abhijeet Landaverde, RN    Summary Note: Completed call. Patient is doing well. He has sold his home and moved into [employer] since fall last August. More support there. There is a friend that stays there and helps as well. Using cane and walker at home. He is on using Tylenol as his current med for back pain/arthritic. Reports he is receiving a visit today from [address] [place] [address] at home as well. No additional home supports indicated. [facility] to complete care coordination. Goals met. Care Coordination Interventions    Program Enrollment:  Rising Risk  Referral from Primary Care Provider:  No  Suggested Interventions and Community Resources         Goals Addressed                 This Visit's Progress       Care Coordination     COMPLETED: Conditions and Symptoms   On track     I will schedule office visits, as directed by my provider. I will keep my appointment or reschedule if I have to cancel. I will notify my provider of any barriers to my plan of care. I will notify my provider of any symptoms that indicate a worsening of my condition. Barriers: impairment:  hearing and visual  Plan for overcoming my barriers: N/A  Confidence: 6/10  Anticipated Goal Completion Date: 11/19    Stable. No new hospitalizations. Completing. Recommended he make 6 month follow up, agreeable. 1/7/20 trb       COMPLETED: Reduce Falls    On track     I will reduce my risk of falls by the following: Remove rugs or use non slip rugs  Install grab bars in bathroom  Use walking aids like cane or walker  will report falls or near falls to [facility]     Barriers: impairment:  visual and cognitive  Plan for overcoming my barriers: N/A  Confidence: 6/10  Anticipated Goal Completion Date: 11/19    Understands fall safety. Using cane or walker. No new falls since last year. Living at daughters now. More support.  1/7/20            Prior to Admission medications    Medication Sig Start Date End Date Taking? Authorizing Provider   fluticasone (FLONASE) 50 MCG/ACT nasal spray 1 spray by Nasal route daily 9/19/19   PEPE Claros CNP   acetaminophen (TYLENOL) 500 MG tablet Take 500 mg by mouth nightly    Historical Provider, MD   Multiple Vitamins-Minerals (EYE VITAMINS PO) Take by mouth    Historical Provider, MD       No future appointments.

## 2020-09-03 ENCOUNTER — TELEPHONE (OUTPATIENT)
Dept: FAMILY MEDICINE CLINIC | Age: 85
End: 2020-09-03

## 2020-09-04 NOTE — TELEPHONE ENCOUNTER
Spoke to patients daughter and advised he is up to date on his pneumonia vaccine and the CDC is recommending getting the flu vaccine end of September or in October.

## 2020-09-22 ENCOUNTER — TELEPHONE (OUTPATIENT)
Dept: FAMILY MEDICINE CLINIC | Age: 85
End: 2020-09-22

## 2020-09-22 NOTE — TELEPHONE ENCOUNTER
Spoke w/ patient.  VV scheduled tomorrow w/ Jacoby Garcia for cough - will schedule flu shot based on visit tomorrow and clx to come into the office

## 2020-09-22 NOTE — TELEPHONE ENCOUNTER
----- Message from Bernice Rocha sent at 9/22/2020  9:10 AM EDT -----  Subject: Message to Provider    QUESTIONS  Information for Provider? P/t wants a flu shot but has a cough. Would like   to get flu shot and get cough checked out.   ---------------------------------------------------------------------------  --------------  CALL BACK INFO  What is the best way for the office to contact you? OK to leave message on   voicemail  Preferred Call Back Phone Number? [phone]  ---------------------------------------------------------------------------  --------------  SCRIPT ANSWERS  Relationship to Patient?  Self

## 2020-09-23 ENCOUNTER — VIRTUAL VISIT (OUTPATIENT)
Dept: FAMILY MEDICINE CLINIC | Age: 85
End: 2020-09-23
Payer: MEDICARE

## 2020-09-23 PROCEDURE — 99213 OFFICE O/P EST LOW 20 MIN: CPT | Performed by: NURSE PRACTITIONER

## 2020-09-23 RX ORDER — BENZONATATE 100 MG/1
100-200 CAPSULE ORAL 3 TIMES DAILY PRN
Qty: 60 CAPSULE | Refills: 0 | Status: SHIPPED | OUTPATIENT
Start: 2020-09-23 | End: 2020-09-30

## 2020-09-23 RX ORDER — FEXOFENADINE HCL 180 MG/1
180 TABLET ORAL DAILY
Status: ON HOLD | COMMUNITY
End: 2021-05-15 | Stop reason: HOSPADM

## 2020-09-23 ASSESSMENT — PATIENT HEALTH QUESTIONNAIRE - PHQ9
SUM OF ALL RESPONSES TO PHQ9 QUESTIONS 1 & 2: 0
1. LITTLE INTEREST OR PLEASURE IN DOING THINGS: 0
SUM OF ALL RESPONSES TO PHQ QUESTIONS 1-9: 0
SUM OF ALL RESPONSES TO PHQ QUESTIONS 1-9: 0
2. FEELING DOWN, DEPRESSED OR HOPELESS: 0

## 2020-09-23 ASSESSMENT — ENCOUNTER SYMPTOMS: RESPIRATORY NEGATIVE: 1

## 2020-09-23 NOTE — PROGRESS NOTES
Danielle Emlore is a 80 y.o. male evaluated via telephone on 9/23/2020. Consent:  He and/or health care decision maker is aware that that he may receive a bill for this telephone service, depending on his insurance coverage, and has provided verbal consent to proceed: Yes      Documentation:  I communicated with the patient and/or health care decision maker about cough. Details of this discussion including any medical advice provided: n/a      I affirm this is a Patient Initiated Episode with an Established Patient who has not had a related appointment within my department in the past 7 days or scheduled within the next 24 hours.     Total Time: minutes: 5-10 minutes    Note: not billable if this call serves to triage the patient into an appointment for the relevant concern      Kika Oseguera

## 2020-09-23 NOTE — PROGRESS NOTES
2020    TELEHEALTH EVALUATION -- Audio/Visual (During [id number] public health emergency)    HPI:    Romana Mose (:  1925) has requested an audio/video evaluation for the following concern(s):    HPI    Zuri Markham presents for a virtual visit today. Zuri Markham states that he has noticed some coughing when he goes to sleep and when he wakes up. He admits to having some sinus drainage. He states he gets allergies every fall and he admits to having some allergies right now. He states he would like to get a prescription to help with his cough. He states he is not having any wheezing or shortness of breath. He states he is not having any fever or chills. He states he has a heater in his room set at 75 degrees and he states he thinks he is dry from the heat. He has some nasal saline spray, but has not used it. Mr. Solo Frances admits to a history of prostate cancer. He states he is doing well and is not having any concerns. He does not mention any blood in his urine or any difficulty urinating. Review of Systems   Constitutional: Negative. Respiratory: Negative. Cardiovascular: Negative. Skin: Negative. Neurological: Negative. Psychiatric/Behavioral: Negative. Prior to Visit Medications    Medication Sig Taking? Authorizing Provider   fexofenadine (ALLEGRA) 180 MG tablet Take 180 mg by mouth daily Yes Historical Provider, MD   Multiple Vitamins-Minerals (CENTRUM SILVER PO) Take by mouth Yes Historical Provider, MD   Multiple Vitamins-Minerals (VITEYES AREDS FORMULA PO) Take by mouth Yes Historical Provider, MD   benzonatate (TESSALON) 100 MG capsule Take 1-2 capsules by mouth 3 times daily as needed for Cough Yes Brenton Frizzle, APRN - CNP   acetaminophen (TYLENOL) 500 MG tablet Take 500 mg by mouth nightly Yes Historical Provider, MD       Social History     Tobacco Use    Smoking status: Never Smoker    Smokeless tobacco: Never Used   Substance Use Topics    Alcohol use:  No Alcohol/week: 0.0 standard drinks    Drug use: No        Allergies   Allergen Reactions    Brimonidine Tartrate      Other reaction(s): low heart rate    Demerol Swelling   ,   Past Medical History:   Diagnosis Date    Arthritis     Atrial fibrillation (HCC)     [facility]re[facility]     Glaucoma     [place] (hard of hearing)     Hyperglycemia     Hyperlipidemia     Macular degeneration     left eye    Prostate cancer ([facility] [address] [facility] 2007    Venous insufficiency    ,   Past Surgical History:   Procedure Laterality Date    CHOLECYSTECTOMY      GLAUCOMA SURGERY      HIP ARTHROPLASTY      bilateral    NOSE SURGERY     ,   Social History     Tobacco Use    Smoking status: Never Smoker    Smokeless tobacco: Never Used   Substance Use Topics    Alcohol use: No     Alcohol/week: 0.0 standard drinks    Drug use: No   ,   Family History   Problem Relation Age of Onset    Cancer Mother     Cancer Father    ,   Immunization History   Administered Date(s) Administered    Influenza 10/01/2013    Influenza Vaccine, unspecified formulation 09/18/2015    Influenza Virus Vaccine 08/30/2017    Influenza, Quadv, IM, PF (6 mo and older Fluzone, Flulaval, Fluarix, and 3 yrs and older Afluria) 09/19/2019    Pneumococcal Polysaccharide ([id number]) 08/30/2017   ,   Health Maintenance   Topic Date Due    Statin Therapy  06/24/1925    DTaP/Tdap/Td vaccine (1 - Tdap) 06/24/1944    Shingles Vaccine (1 of 2) 06/24/1975    PSA counseling  06/24/1975    Annual Wellness Visit (AWV)  06/21/2019    Flu vaccine (1) 09/01/2020    Pneumococcal 65+ years Vaccine  Completed    Hepatitis A vaccine  Aged Out    Hepatitis B vaccine  Aged Out    Hib vaccine  Aged Out    Meningococcal (ACWY) vaccine  Aged Out       PHYSICAL EXAMINATION:  [ INSTRUCTIONS:  \"[x]\" Indicates a positive item  \"[]\" Indicates a negative item      Vital Signs: (As obtained by patient/caregiver or practitioner observation)    Blood pressure- Heart rate-    Respiratory rate-    Temperature-  Pulse oximetry-     Constitutional: [x] Appears well-developed and well-nourished [x] No apparent distress      [] Abnormal-   Mental status  [x] Alert and awake  [x] Oriented to person/place/time [x]Able to follow commands      Eyes:  EOM    [x]  Normal  [] Abnormal-  Sclera  [x]  Normal  [] Abnormal -         Discharge [x]  None visible  [] Abnormal -    HENT:   [x] Normocephalic, atraumatic. [] Abnormal   [x] Mouth/Throat: Mucous membranes are moist.     External Ears [x] Normal  [] Abnormal-     Neck: [x] No visualized mass     Pulmonary/Chest: [x] Respiratory effort normal.  [x] No visualized signs of difficulty breathing or respiratory distress        [] Abnormal-      Musculoskeletal:   [x] Normal gait with no signs of ataxia         [x] Normal range of motion of neck        [] Abnormal-       Neurological:        [x] No Facial Asymmetry (Cranial nerve 7 motor function) (limited exam to video visit)          [x] No gaze palsy        [] Abnormal-         Skin:        [x] No significant exanthematous lesions or discoloration noted on facial skin         [] Abnormal-            Psychiatric:       [x] Normal Affect [x] No Hallucinations        [] Abnormal-     ASSESSMENT/PLAN:  Lindsey Villegas was seen today for cough. Tessalon pearls for cough and sinus drainage. Recommend OTC antihistamine for his allergy and nasal saline spray or a cool mist humidifier to moisturize his sinuses. Prostate cancer symptoms stable. No mention of blood in urine or difficulty urinating. Diagnoses and all orders for this visit:    Sinus drainage  -     benzonatate (TESSALON) 100 MG capsule; Take 1-2 capsules by mouth 3 times daily as needed for Cough    Environmental allergies    Prostate cancer ([facility] [address]        Return if symptoms worsen or fail to improve.     Lacretia Lab is a 80 y.o. male being evaluated by a Virtual Visit (video visit) encounter to address concerns as mentioned

## 2021-01-08 ENCOUNTER — TELEPHONE (OUTPATIENT)
Dept: FAMILY MEDICINE CLINIC | Age: 86
End: 2021-01-08

## 2021-01-08 NOTE — TELEPHONE ENCOUNTER
----- Message from Sylvie Fitzpatrick sent at 1/8/2021 10:29 AM EST -----  Subject: Message to Provider    QUESTIONS  Information for Provider? Pt would like to be notified once covid vaccine   is being offered to [facility]ts of [facility]. ---------------------------------------------------------------------------  --------------  Grace PEGUERO  What is the best way for the office to contact you? OK to leave message on   voicemail  Preferred Call Back Phone Number? [phone]  ---------------------------------------------------------------------------  --------------  SCRIPT ANSWERS  Relationship to Patient?  Self

## 2021-01-08 NOTE — TELEPHONE ENCOUNTER
We have not been keeping a list of patients who want the vaccine as we are not even sure that we will be giving the vaccine, my advice would be for patient to check back with us on a biweekly or monthly schedule for updates

## 2021-02-04 ENCOUNTER — OFFICE VISIT (OUTPATIENT)
Dept: FAMILY MEDICINE CLINIC | Age: 86
End: 2021-02-04
Payer: MEDICARE

## 2021-02-04 VITALS
BODY MASS INDEX: 25.31 KG/M2 | HEART RATE: 71 BPM | SYSTOLIC BLOOD PRESSURE: 169 MMHG | HEIGHT: 68 IN | WEIGHT: 167 LBS | DIASTOLIC BLOOD PRESSURE: 110 MMHG | TEMPERATURE: 98 F | OXYGEN SATURATION: 97 %

## 2021-02-04 DIAGNOSIS — L98.9 SKIN LESION OF CHEEK: Primary | ICD-10-CM

## 2021-02-04 PROCEDURE — 99214 OFFICE O/P EST MOD 30 MIN: CPT | Performed by: NURSE PRACTITIONER

## 2021-02-04 RX ORDER — DOXYCYCLINE HYCLATE 100 MG
100 TABLET ORAL 2 TIMES DAILY
Qty: 14 TABLET | Refills: 0 | Status: SHIPPED | OUTPATIENT
Start: 2021-02-04 | End: 2021-02-11

## 2021-02-04 NOTE — PROGRESS NOTES
[facility]YSIC[facility]ACT[facility]  [facility]  [address] [place] [address]  Dept: [phone]  Dept Fax: [phone]  Loc: [phone]    Nadege Tobar is a 80 y.o. male who presents today for his medical conditions/complaints as noted below. Nadege Tobar is c/o of Other (pt states hehad 2 pimple like things on his left cheek bone. now bump is bigger and pt is concerned it may be a boil. )        HPI:     Chief Complaint   Patient presents with    Other     pt states hehad 2 pimple like things on his left cheek bone. now bump is bigger and pt is concerned it may be a boil. HPI     Ann Myers presents with his daughter to the office today with a concern for a possible boil under his left cheek. He states that he noticed 2 pimple-like spot on his cheek and his daughter noticed that feels like he had inflamed hairs in the pimple-like skin lesion. He noticed this skin lesion occurring over the last 3 days and has made progressively getting worse. He denies any pain. He denies any drainage coming from the spine his skin. He admits that he had a spot like this in the past and had to get an incision and drainage. Ann Myers has not tried anything to help with the skin lesion. He admits that over the last 3 days the skin lesion has been getting bigger. He denies any fever or chills. He denies any lack of appetite or increase in fatigue.     Past Medical History:   Diagnosis Date    Arthritis     Atrial fibrillation ([address]     CVA (cerebral vascular accident) ([address]    Nik Jaramillo Glaucoma     [place] (hard of hearing)     Hyperglycemia     Hyperlipidemia     Macular degeneration     left eye    Prostate cancer ([address] 2007    Venous insufficiency       Past Surgical History:   Procedure Laterality Date    CHOLECYSTECTOMY      GLAUCOMA SURGERY      HIP ARTHROPLASTY      bilateral    NOSE SURGERY         Family History   Problem Relation Age of Onset    Cancer Mother     Cancer Father Social History     Tobacco Use    Smoking status: Never Smoker    Smokeless tobacco: Never Used   Substance Use Topics    Alcohol use: No     Alcohol/week: 0.0 standard drinks      Current Outpatient Medications   Medication Sig Dispense Refill    doxycycline hyclate (VIBRA-TABS) 100 MG tablet Take 1 tablet by mouth 2 times daily for 7 days 14 tablet 0    fexofenadine (ALLEGRA) 180 MG tablet Take 180 mg by mouth daily      Multiple Vitamins-Minerals (CENTRUM SILVER PO) Take by mouth      Multiple Vitamins-Minerals (VITEYES AREDS FORMULA PO) Take by mouth      acetaminophen (TYLENOL) 500 MG tablet Take 500 mg by mouth nightly       No current facility-administered medications for this visit. Allergies   Allergen Reactions    Brimonidine Tartrate      Other reaction(s): low heart rate    Demerol Swelling       :      Review of Systems   Constitutional: Negative. Respiratory: Negative. Cardiovascular: Negative. Skin: Positive for wound (Skin lesion). Negative for color change, pallor and rash. Neurological: Negative. Psychiatric/Behavioral: Negative. Objective:     Vitals:    02/04/21 1547 02/04/21 1552   BP: (!) 147/100 (!) 169/110   Site: Left Upper Arm Left Upper Arm   Position: Sitting Sitting   Cuff Size: Medium Adult Medium Adult   Pulse: 90 71   Temp: 98 °F (36.7 °C)    SpO2: 97%    Weight: 167 lb (75.8 kg)    Height: 5' 8\" (1.727 m)      Wt Readings from Last 3 Encounters:   02/04/21 167 lb (75.8 kg)   09/19/19 172 lb (78 kg)   07/02/19 175 lb (79.4 kg)     Temp Readings from Last 3 Encounters:   02/04/21 98 °F (36.7 °C)   09/19/19 97.6 °F (36.4 °C)   08/30/18 97.6 °F (36.4 °C) (Temporal)     BP Readings from Last 3 Encounters:   02/04/21 (!) 169/110   09/19/19 (!) 147/77   07/02/19 138/72     Pulse Readings from Last 3 Encounters:   02/04/21 71   09/19/19 79   07/02/19 65     Physical Exam  Vitals signs and nursing note reviewed.    Constitutional: General: He is not in acute distress. Appearance: Normal appearance. He is well-developed. He is not diaphoretic. HENT:      Head: Normocephalic and atraumatic. Right Ear: External ear normal.      Left Ear: External ear normal.      Nose: Nose normal.   Eyes:      General:         Right eye: No discharge. Left eye: No discharge. Conjunctiva/sclera: Conjunctivae normal.   Neck:      Musculoskeletal: Normal range of motion and neck supple. No neck rigidity. Cardiovascular:      Rate and Rhythm: Normal rate. Pulmonary:      Effort: Pulmonary effort is normal.   Musculoskeletal: Normal range of motion. General: No deformity. Skin:     General: Skin is warm and dry. Coloration: Skin is not jaundiced or pale. Findings: Lesion (Firm, raised, erythematous skin lesion) present. No bruising, erythema or rash. Neurological:      Mental Status: He is alert and oriented to person, place, and time. Mental status is at baseline. Psychiatric:         Mood and Affect: Mood normal.         Behavior: Behavior normal.         Thought Content:  Thought content normal.         Judgment: Judgment normal.         Nurse Only on 07/03/2019   Component Date Value Ref Range Status    Hemoglobin A1C 07/03/2019 5.6  See comment % Final    Comment: Comment:  Diagnosis of Diabetes: > or = 6.5%  Increased risk of diabetes (Prediabetes): 5.7-6.4%  Glycemic Control: Nonpregnant Adults: <7.0%                    Pregnant: <6.0%        eAG 07/03/2019 114.0  mg/dL Final    Cholesterol, Total 07/03/2019 179  0 - 199 mg/dL Final    Triglycerides 07/03/2019 48  0 - 150 mg/dL Final    HDL 07/03/2019 63* 40 - 60 mg/dL Final    LDL Calculated 07/03/2019 106* <100 mg/dL Final    VLDL Cholesterol Calculated 07/03/2019 10  Not Established mg/dL Final    WBC 07/03/2019 5.7  4.0 - 11.0 K/uL Final    RBC 07/03/2019 4.10* 4.20 - 5.90 M/uL Final  Hemoglobin 07/03/2019 13.1* 13.5 - 17.5 g/dL Final    Hematocrit 07/03/2019 38.8* 40.5 - 52.5 % Final    MCV 07/03/2019 94.6  80.0 - 100.0 fL Final    MCH 07/03/2019 31.9  26.0 - 34.0 pg Final    MCHC 07/03/2019 33.7  31.0 - 36.0 g/dL Final    RDW 07/03/2019 14.6  12.4 - 15.4 % Final    Platelets 04/26/6805 190  135 - 450 K/uL Final    MPV 07/03/2019 7.8  5.0 - 10.5 fL Final    Neutrophils % 07/03/2019 62.8  % Final    Lymphocytes % 07/03/2019 24.8  % Final    Monocytes % 07/03/2019 7.5  % Final    Eosinophils % 07/03/2019 4.2  % Final    Basophils % 07/03/2019 0.7  % Final    Neutrophils Absolute 07/03/2019 3.6  1.7 - 7.7 K/uL Final    Lymphocytes Absolute 07/03/2019 1.4  1.0 - 5.1 K/uL Final    Monocytes Absolute 07/03/2019 0.4  0.0 - 1.3 K/uL Final    Eosinophils Absolute 07/03/2019 0.2  0.0 - 0.6 K/uL Final    Basophils Absolute 07/03/2019 0.0  0.0 - 0.2 K/uL Final    Sodium 07/03/2019 143  136 - 145 mmol/L Final    Potassium 07/03/2019 4.1  3.5 - 5.1 mmol/L Final    Chloride 07/03/2019 104  99 - 110 mmol/L Final    CO2 07/03/2019 26  21 - 32 mmol/L Final    Anion Gap 07/03/2019 13  3 - 16 Final    Glucose 07/03/2019 94  70 - 99 mg/dL Final    BUN 07/03/2019 16  7 - 20 mg/dL Final    CREATININE 07/03/2019 1.0  0.8 - 1.3 mg/dL Final    GFR Non- 07/03/2019 >60  >60 Final    Comment: >60 mL/min/1.73m2 EGFR, calc. for ages 25 and older using the  MDRD formula (not corrected for weight), is valid for stable  renal function.  GFR  07/03/2019 >60  >60 Final    Comment: Chronic Kidney Disease: less than 60 ml/min/1.73 sq.m. Kidney Failure: less than 15 ml/min/1.73 sq.m. Results valid for patients 18 years and older.       Calcium 07/03/2019 9.5  8.3 - 10.6 mg/dL Final    Total Protein 07/03/2019 6.8  6.4 - 8.2 g/dL Final    Albumin 07/03/2019 4.5  3.4 - 5.0 g/dL Final    Albumin/Globulin Ratio 07/03/2019 2.0  1.1 - 2.2 Final  Total Bilirubin 07/03/2019 0.6  0.0 - 1.0 mg/dL Final    Alkaline Phosphatase 07/03/2019 61  40 - 129 U/L Final    ALT 07/03/2019 8* 10 - 40 U/L Final    AST 07/03/2019 14* 15 - 37 U/L Final    Globulin 07/03/2019 2.3  g/dL Final           Assessment & Plan: The following diagnoses and conditions are stable with no further orders unless indicated:  1. Skin lesion of Camelia Shahid was seen today for other. Wound concerning for possible cyst versus skin cancer. Doxycycline prescribed for possible cyst and recommend warm compresses to site 2-3 times a day for 15 minutes at a time. Recommend following up with general surgery. Referral given. Recommend him not trying to \"pop\" the cyst on his face. Diagnoses and all orders for this visit:    Skin lesion of cheek  -     doxycycline hyclate (VIBRA-TABS) 100 MG tablet; Take 1 tablet by mouth 2 times daily for 7 days  -     [address] MD [place], General Surgery, [facility]      Prior to Visit Medications    Medication Sig Taking? Authorizing Provider   doxycycline hyclate (VIBRA-TABS) 100 MG tablet Take 1 tablet by mouth 2 times daily for 7 days Yes Dank Looney, APRN - CNP   fexofenadine (ALLEGRA) 180 MG tablet Take 180 mg by mouth daily Yes Historical Provider, MD   Multiple Vitamins-Minerals (CENTRUM SILVER PO) Take by mouth Yes Historical Provider, MD   Multiple Vitamins-Minerals (VITEYES AREDS FORMULA PO) Take by mouth Yes Historical Provider, MD   acetaminophen (TYLENOL) 500 MG tablet Take 500 mg by mouth nightly Yes Historical Provider, MD     Orders Placed This Encounter   Medications    doxycycline hyclate (VIBRA-TABS) 100 MG tablet     Sig: Take 1 tablet by mouth 2 times daily for 7 days     Dispense:  14 tablet     Refill:  0         No follow-ups on file. Patient should call the office immediately with new or ongoing signs or symptoms or worsening, or proceedto the emergency room. No changes in past medical history, past surgical history, social history, or family history were noted during the patient encounter unless specifically listed above. All updates of past medicalhistory, past surgical history, social history, or family history were reviewed personally by me during the office visit. All problems listed in the assessment are stable unless noted otherwise. Medication profilereviewed personally by me during the office visit. Medication side effects and possible impairments from medications were discussed as applicable. Call if pattern of symptoms change or persists for an extended time. This document was prepared by a combination of typing and transcription through a voice recognition software. All medications have the potential for adverse effects. All medications effect each person differently. Please read and review provided information related to medication. If the medication that you have been prescribed has the potential to cause sedation, do not drive or operate car, truck, or heavy machinery until you know how the medication will effect you. If you experience any adverse effects from the medication, please call the office or report to the emergency department. Please use yogurt daily or take 2 probiotic tablets twice daily while on the antibiotic. These treatments will help to replenish the \"good bacteria\" in the gut which is killed by the antibiotic. Call if you get severe diarrhea, trouble breathing, trouble swallowing, swelling of the lips or tongue, or rash.

## 2021-02-05 ENCOUNTER — TELEPHONE (OUTPATIENT)
Dept: FAMILY MEDICINE CLINIC | Age: 86
End: 2021-02-05

## 2021-02-05 NOTE — TELEPHONE ENCOUNTER
----- Message from PEPE Richardson CNP sent at 2/4/2021  4:47 PM EST -----  How is spot below left eye doing? If no improvement by Monday, recommend follow up with general surgery Dr. Mony Rowe to do biopsy to rule out potential malignancies such as melanoma that can resemble a boil.

## 2021-02-05 NOTE — TELEPHONE ENCOUNTER
Patient called. States that the spot does not hurt anymore and appears to be going down. He feels it is getting better and doesn't feel he wants to follow up with general surgery about it.

## 2021-02-08 DIAGNOSIS — L98.9 SKIN LESION OF CHEEK: Primary | ICD-10-CM

## 2021-02-08 ASSESSMENT — ENCOUNTER SYMPTOMS
COLOR CHANGE: 0
RESPIRATORY NEGATIVE: 1

## 2021-02-08 NOTE — PATIENT INSTRUCTIONS
Patient Education        Skin Lesions: Care Instructions  Your Care Instructions  A skin lesion is a general term used for the different types of bumps, spots, moles or other growths that may appear on your skin. Most skin lesions are harmless, but sometimes they can be a sign of skin cancer or other health problems. Depending on what type of lesion you have, your doctor may cut out all or a small area of the skin tissue and send it to a lab to be looked at under a microscope. This is called a biopsy. A biopsy may be done to figure out what the lesion is or to make sure it is not skin cancer. Follow-up care is a key part of your treatment and safety. Be sure to make and go to all appointments, and call your doctor if you are having problems. It's also a good idea to know your test results and keep a list of the medicines you take. How can you care for yourself at home? · If your doctor told you how to care for your wound, follow your doctor's instructions. If you did not get instructions, follow this general advice:  ? Keep the wound bandaged and dry for the first day. ? After the first day, wash around the wound with clean water 2 times a day. Don't use hydrogen peroxide or alcohol, which can slow healing. ? You may cover the wound with a thin layer of petroleum jelly, such as Vaseline, and a nonstick bandage. ? Apply more petroleum jelly and replace the bandage as needed. · If you have stitches, you may get other instructions. You will have to return to have the stitches removed. · If a scab forms, do not pull it off. Let it fall off on its own. Wounds heal faster if no scab forms. Washing the area every day and using petroleum jelly will help keep a scab from forming. · If the wound bleeds, put direct pressure on it with a clean cloth until the bleeding stops. · Take an over-the-counter pain medicine, such as acetaminophen (Tylenol), ibuprofen (Advil, Motrin), or naproxen (Aleve). Read and follow all instructions on the label. · Do not take two or more pain medicines at the same time unless the doctor told you to. Many pain medicines have acetaminophen, which is Tylenol. Too much acetaminophen (Tylenol) can be harmful. · If you had a growth \"frozen\" off with liquid nitrogen, you may get a blister. Do not break it. Let it dry up on its own. It is common for the blister to fill with blood. You do not need to do anything about this, but if it becomes too painful, call your doctor. When should you call for help? Call your doctor now or seek immediate medical care if:    · You have signs of infection, such as:  ? Increased pain, swelling, warmth, or redness. ? Red streaks leading from the wound. ? Pus draining from the wound. ? A fever. Watch closely for changes in your health, and be sure to contact your doctor if:    · The wound changes, bleeds, or gets worse.     · You do not get better after 2 weeks of home care. Where can you learn more? Go to [email or web address] org and sign in to your [employer] account. Enter V201 in the [facility] box to learn more about \"Skin Lesions: Care Instructions. \"     If you do not have an account, please click on the \"Sign Up Now\" link. Current as of: July 2, 2020               Content Version: 12.6  © 0366-2744 "[employer]. Care instructions adapted under license by [facility] ([facility]). If you have questions about a medical condition or this instruction, always ask your healthcare professional. [place] [address] any warranty or liability for your use of this information.

## 2021-02-23 ENCOUNTER — TELEPHONE (OUTPATIENT)
Dept: FAMILY MEDICINE CLINIC | Age: 86
End: 2021-02-23

## 2021-02-23 DIAGNOSIS — Z91.81 HISTORY OF RECENT FALL: Primary | ICD-10-CM

## 2021-02-23 NOTE — TELEPHONE ENCOUNTER
Daughter Gabino Mcgovern called and was wanting to see if she could get an order for pt for PT. Said that he fell yesterday peeled some skin back but is fine besides that. Not getting around well and thought that the PT might help.

## 2021-02-24 NOTE — TELEPHONE ENCOUNTER
We have not seen the patient to discuss PT, only family request.  Likely will need to schedule an audiovisual visit to support the need for PT

## 2021-02-24 NOTE — TELEPHONE ENCOUNTER
Robbie Billings with [address] called stating they were waiting on the order for PT. They are also needing progress notes and med list. Not seeing any notes regarding the PT. Does pt need a F2F for PT? Call back for Robbie Billings [phone]. Fax [phone].

## 2021-02-25 NOTE — TELEPHONE ENCOUNTER
Ed Kerr with [address] [place], [place] [address] called wanting to f/u on visit note for pt. Informed that pt will need a F2F for the PT order and just wanting on a time to schedule appt. Ed Kerr did mention that if pt doesn't have the technology to do a VV, they have been going out to pt's home's to do the VV with them and provide the technology for the pt's. Once F2F scheduled, please call [address] [phone] so someone can be there to help the pt for the VV. Will need to fax note when appt is completed.

## 2021-03-02 ENCOUNTER — VIRTUAL VISIT (OUTPATIENT)
Dept: FAMILY MEDICINE CLINIC | Age: 86
End: 2021-03-02
Payer: MEDICARE

## 2021-03-02 DIAGNOSIS — M21.372 FOOT DROP, LEFT: Primary | ICD-10-CM
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
## 2021-03-02 DIAGNOSIS — M25.872 DECREASED PROPRIOCEPTION OF JOINT OF FOOT, LEFT: ICD-10-CM

## 2021-03-02 DIAGNOSIS — I63.9 CEREBRAL INFARCTION, UNSPECIFIED MECHANISM (HCC): ICD-10-CM
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
## 2021-03-02 PROCEDURE — 99214 OFFICE O/P EST MOD 30 MIN: CPT | Performed by: FAMILY MEDICINE

## 2021-03-02 ASSESSMENT — PATIENT HEALTH QUESTIONNAIRE - PHQ9
1. LITTLE INTEREST OR PLEASURE IN DOING THINGS: 0
SUM OF ALL RESPONSES TO PHQ9 QUESTIONS 1 & 2: 0
SUM OF ALL RESPONSES TO PHQ QUESTIONS 1-9: 0

## 2021-03-02 NOTE — PROGRESS NOTES
3/2/2021    TELEHEALTH EVALUATION -- Audio/Visual (During QXULG-82 public health emergency)    HPI:    Dianna Martinez (:  1925) has requested an audio/video evaluation for the following concern(s):    Patient would like to begin physical therapy due to pain in his left foot and some numbness and tingling. He has not been able to really use the leg well and it drags. He wants to do PT to help in strengthening the leg. The injury to the left leg occurred after patient lost his balance and fell into the bath tub hitting his leg on the side and becoming stuck in the tub for a matter of time. Patient does use a walker to get around the house and when he goes out. Patient actually had fell twice 2 weeks ago Monday. Patient has had foot drop and inability to sense where his left foot is at since  hip surgery. The lack of sensation of knowing where the foot is that is getting worse. He thinks his balance is fair. All other ROS negative    Prior to Visit Medications    Medication Sig Taking?  Authorizing Provider   fexofenadine (ALLEGRA) 180 MG tablet Take 180 mg by mouth daily Yes Historical Provider, MD   Multiple Vitamins-Minerals (CENTRUM SILVER PO) Take by mouth Yes Historical Provider, MD   Multiple Vitamins-Minerals (VITEYES AREDS FORMULA PO) Take by mouth Yes Historical Provider, MD   acetaminophen (TYLENOL) 500 MG tablet Take 500 mg by mouth nightly Yes Historical Provider, MD       Allergies   Allergen Reactions    Brimonidine Tartrate      Other reaction(s): low heart rate    Demerol Swelling       Past Medical History:   Diagnosis Date    Arthritis     Atrial fibrillation ([address]     CVA (cerebral vascular accident) ([address]     Glaucoma     [place] (hard of hearing)     Hyperglycemia     Hyperlipidemia     Macular degeneration     left eye    Prostate cancer ([address] 2007    Venous insufficiency        Past Surgical History:   Procedure Laterality Date    CHOLECYSTECTOMY  GLAUCOMA SURGERY      HIP ARTHROPLASTY      bilateral    NOSE SURGERY         Social History     Tobacco Use    Smoking status: Never Smoker    Smokeless tobacco: Never Used   Substance Use Topics    Alcohol use: No     Alcohol/week: 0.0 standard drinks    Drug use: No       PHYSICAL EXAMINATION:  [ INSTRUCTIONS:  \"[x]\" Indicates a positive item  \"[]\" Indicates a negative item  -- DELETE ALL ITEMS NOT EXAMINED]  Vital Signs: (As obtained by patient/caregiver or practitioner observation)  See flowsheet  Blood pressure- Heart rate-    Respiratory rate-    Temperature-  Pulse oximetry-     Constitutional: [x] Appears well-developed and well-nourished [x] No apparent distress      [] Abnormal-   Mental status  [x] Alert and awake  [x] Oriented to person/place/time [x]Able to follow commands      Eyes:  EOM    [x]  Normal  [] Abnormal-  Sclera  [x]  Normal  [] Abnormal -         Discharge [x]  None visible  [] Abnormal -    HENT:   [x] Normocephalic, atraumatic. [] Abnormal   [] Mouth/Throat: Mucous membranes are moist.     External Ears [x] Normal  [] Abnormal-     Neck: [x] No visualized mass     Pulmonary/Chest: [x] Respiratory effort normal.  [x] No visualized signs of difficulty breathing or respiratory distress        [] Abnormal-      Musculoskeletal:   [] Normal gait with no signs of ataxia         [x] Normal range of motion of neck        [] Abnormal-       Neurological:        [x] No Facial Asymmetry (Cranial nerve 7 motor function) (limited exam to video visit)          [x] No gaze palsy        [] Abnormal-         Skin:        [x] No significant exanthematous lesions or discoloration noted on facial skin         [] Abnormal-            Psychiatric:       [x] Normal Affect [x] No Hallucinations        [] Abnormal-     Other pertinent observable physical exam findings-      ASSESSMENT PLAN      Diagnosis Orders   1. Foot drop, left     2.  Cerebral infarction, unspecified mechanism ([facility] 3. Fall, sequela     4. Decreased proprioception of joint of foot, left     I believe patient would benefit from physical therapy in the home. This note will be sent to [facility] care to initiate therapy. He will follow-up in the office in 2 months. Patient should call the office immediately with new or ongoing signs or symptoms or worsening, or proceed to the emergency room. No changes in past medical history, past surgical history, social history, or family history were noted during the patient encounter unless specifically listed above. All updates of past medical history, past surgical history, social history, or family history were reviewed personally by me during the office visit. All problems listed in the assessment are stable unless noted otherwise. Medication profile reviewed personally by me during the visit. Medication side effects and possible impairments from medications were discussed as applicable. This document was prepared by a combination of typing and transcription through a voice recognition software. Mario Peguero is a 80 y.o. male being evaluated by a Virtual Visit (video visit) encounter to address concerns as mentioned above. A caregiver was present when appropriate. Due to this being a TeleHealth encounter (During XSITM-65 public health emergency), evaluation of the following organ systems was limited: Vitals/Constitutional/EENT/Resp/CV/GI//MS/Neuro/Skin/Heme-Lymph-Imm. Pursuant to the emergency declaration under the [address] [place] [address] and the [employer] and [employer] Act, this Virtual Visit was conducted with patient's (and/or legal guardian's) consent, to reduce the patient's risk of exposure to COVID-19 and provide necessary medical care. The patient (and/or legal guardian) has also been advised to contact this office for worsening conditions or problems, and seek emergency medical treatment and/or call 911 if deemed necessary. Services were provided through a video synchronous discussion virtually to substitute for in-person clinic visit. Patient and provider were located at their individual homes. --Leonie Lal RN on 3/2/2021 at 4:37 PM    An electronic signature was used to authenticate this note.

## 2021-03-04 ENCOUNTER — TELEPHONE (OUTPATIENT)
Dept: FAMILY MEDICINE CLINIC | Age: 86
End: 2021-03-04

## 2021-03-05 DIAGNOSIS — M25.872 DECREASED PROPRIOCEPTION OF JOINT OF FOOT, LEFT: ICD-10-CM
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
## 2021-03-05 DIAGNOSIS — M21.372 FOOT DROP, LEFT: Primary | ICD-10-CM
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
## 2021-04-07 ENCOUNTER — TELEPHONE (OUTPATIENT)
Dept: FAMILY MEDICINE CLINIC | Age: 86
End: 2021-04-07

## 2021-04-07 DIAGNOSIS — R05.8 COUGH WITH CONGESTION OF PARANASAL SINUS: Primary | ICD-10-CM

## 2021-04-07 DIAGNOSIS — R09.81 COUGH WITH CONGESTION OF PARANASAL SINUS: Primary | ICD-10-CM

## 2021-04-07 RX ORDER — AZITHROMYCIN 250 MG/1
250 TABLET, FILM COATED ORAL SEE ADMIN INSTRUCTIONS
Qty: 6 TABLET | Refills: 0 | Status: SHIPPED | OUTPATIENT
Start: 2021-04-07 | End: 2021-04-12

## 2021-04-27 ENCOUNTER — VIRTUAL VISIT (OUTPATIENT)
Dept: FAMILY MEDICINE CLINIC | Age: 86
End: 2021-04-27
Payer: MEDICARE

## 2021-04-27 VITALS — WEIGHT: 167 LBS | TEMPERATURE: 98.2 F | BODY MASS INDEX: 25.31 KG/M2 | HEIGHT: 68 IN

## 2021-04-27 DIAGNOSIS — Z00.00 ROUTINE GENERAL MEDICAL EXAMINATION AT A HEALTH CARE FACILITY: Primary | ICD-10-CM

## 2021-04-27 PROCEDURE — G0439 PPPS, SUBSEQ VISIT: HCPCS | Performed by: FAMILY MEDICINE

## 2021-04-27 ASSESSMENT — PATIENT HEALTH QUESTIONNAIRE - PHQ9
SUM OF ALL RESPONSES TO PHQ QUESTIONS 1-9: 0
SUM OF ALL RESPONSES TO PHQ QUESTIONS 1-9: 0

## 2021-04-27 ASSESSMENT — LIFESTYLE VARIABLES: HOW OFTEN DO YOU HAVE A DRINK CONTAINING ALCOHOL: 0

## 2021-04-27 NOTE — PROGRESS NOTES
Medicare Annual Wellness Visit  Name: Samson Wade Date: 2021   MRN: [id number] Sex: Male   Age: 80 y.o. Ethnicity: Non-/Non    : 1925 Race: Abby Hammans is here for Medicare AWV    Screenings for behavioral, psychosocial and functional/safety risks, and cognitive dysfunction are all negative except as indicated below. These results, as well as other patient data from the [address] form, are documented in Flowsheets linked to this Encounter. Allergies   Allergen Reactions    Brimonidine Tartrate      Other reaction(s): low heart rate    Demerol Swelling       Prior to Visit Medications    Medication Sig Taking?  Authorizing Provider   fexofenadine (ALLEGRA) 180 MG tablet Take 180 mg by mouth daily  Historical Provider, MD   Multiple Vitamins-Minerals (CENTRUM SILVER PO) Take by mouth  Historical Provider, MD   Multiple Vitamins-Minerals (VITEYES AREDS FORMULA PO) Take by mouth  Historical Provider, MD   acetaminophen (TYLENOL) 500 MG tablet Take 500 mg by mouth nightly  Historical Provider, MD       Past Medical History:   Diagnosis Date    Arthritis     Atrial fibrillation ([address]     CVA (cerebral vascular accident) ([address]     Glaucoma     [place] (hard of hearing[place]     Hyperglycemia     Hyperlipidemia     Macular degeneration     left eye    Prostate cancer ([address] 2007    Venous insufficiency        Past Surgical History:   Procedure Laterality Date    CHOLECYSTECTOMY      GLAUCOMA SURGERY      HIP ARTHROPLASTY      bilateral    NOSE SURGERY         Family History   Problem Relation Age of Onset    Cancer Mother     Cancer Father        CareTeam (Including outside providers/suppliers regularly involved in providing care):   Patient Care Team:  Leonidas Jalloh MD as PCP - Tomas Cherry MD as PCP - [facility] Empaneled Provider  Sariah Stein MD (Orthopedic Surgery)  David Francois MD as Consulting Physician Flu vaccine (Season Ended) 09/01/2021    Pneumococcal 65+ years Vaccine  Completed    COVID-19 Vaccine  Completed    Hepatitis A vaccine  Aged Out    Hepatitis B vaccine  Aged Out    Hib vaccine  Aged Out    Meningococcal (ACWY) vaccine  Aged Out     Recommendations for [employer] Due: see orders and patient instructions/AVS.  . Recommended screening schedule for the next 5-10 years is provided to the patient in written form: see Patient Instructions/AVS.    Mike MALIN LPN, [birth year], performed the documented evaluation under the direct supervision of the attending physician. Zuhair Jara, was evaluated through a synchronous (real-time) audio-video encounter. The patient (or guardian if applicable) is aware that this is a billable service. Verbal consent to proceed has been obtained within the past 12 months. The visit was conducted pursuant to the emergency declaration under the [address] [place] [address] authority and the [employer] and [employer] General Act. Patient identification was verified, and a caregiver was present when appropriate. The patient was located in a state where the provider was credentialed to provide care. Total time spent for this encounter: Not billed by time   Location [facility]    --Mike Garcia LPN on 3/54/2080 at 9:60 PM    An electronic signature was used to authenticate this note. This encounter was performed under Goldy delacruz MDs, direct supervision, 4/27/2021.

## 2021-04-27 NOTE — PATIENT INSTRUCTIONS
even the most youthful senior more prone to accidents. Falls are one of the leading health risks for older people. This increased risk of falling is related to:   Aging process (eg, decreased muscle strength, slowed reflexes)   Higher incidence of chronic health problems (eg, arthritis, diabetes) that may limit mobility, agility or sensory awareness   Side effects of medicine (eg, dizziness, blurred vision)especially medicines like prescription pain medicines and drugs used to treat mental health conditions   Depending on the brittleness of your bones, the consequences of a fall can be serious and long lasting. Home Life   Research by the Association of Aging [facility]) shows that some home accidents among older adults can be prevented by making simple lifestyle changes and basic modifications and repairs to the home environment. Here are some lifestyle changes that experts recommend:   Have your hearing and vision checked regularly. Be sure to wear prescription glasses that are right for you. Speak to your doctor or pharmacist about the possible side effects of your medicines. A number of medicines can cause dizziness. If you have problems with sleep, talk to your doctor. Limit your intake of alcohol. If necessary, use a cane or walker to help maintain your balance. Wear supportive, rubber-soled shoes, even at home. If you live in a region that gets wintry weather, you may want to put special cleats on your shoes to prevent you from slipping on the snow and ice. Exercise regularly to help maintain muscle tone, agility, and balance. Always hold the banister when going up or down stairs. Also, use  bars when getting in or out of the bath or shower, or using the toilet. To avoid dizziness, get up slowly from a lying down position. Sit up first, dangling your legs for a minute or two before rising to a standing position.    Overall Home Safety Check   According to the Consumer Product Safety Commision's \"Older Consumer Home Safety Checklist,\" it is important to check for potential hazards in each room. And remember, proper lighting is an essential factor in home safety. If you cannot see clearly, you are more likely to fall. Important questions to ask yourself include:   Are lamp, electric, extension, and telephone cords placed out of the flow of traffic and maintained in good condition? Have frayed cords been replaced? Are all small rugs and runners slip resistant? If not, you can secure them to the floor with a special double-sided carpet tape. Are smoke detectors properly locatedone on every floor of your home and one outside of every sleeping area? Are they in good working order? Are batteries replaced at least once a year? Do you have a well-maintained carbon monoxide detector outside every sleeping are in your home? Does your furniture layout leave plenty of space to maneuver between and around chairs, tables, beds, and sofas? Are hallways, stairs and passages between rooms well lit? Can you reach a lamp without getting out of bed? Are floor surfaces well maintained? Shag rugs, high-pile carpeting, tile floors, and polished wood floors can be particularly slippery. Stairs should always have handrails and be carpeted or fitted with a non-skid tread. Is your telephone easily reachable. Is the cord safely tucked away? Room by Room   According to the Association of Aging, bathrooms and nnamdi are the two most potentially hazardous rooms in your home. In the Kitchen    Be sure your stove is in proper working order and always make sure burners and the oven are off before you go out or go to sleep. Keep pots on the back burners, turn handles away from the front of the stove, and keep stove clean and free of grease build-up. Kitchen ventilation systems and range exhausts should be working properly.     Keep flammable objects such as towels and pot holders away from the cooking area except when in use. Make sure kitchen curtains are tied back. Move cords and appliances away from the sink and hot surfaces. If extension cords are needed, install wiring guides so they do not hang over the sink, range, or working areas. Look for coffee pots, kettles and toaster ovens with automatic shut-offs. Keep a mop handy in the kitchen so you can wipe up spills instantly. You should also have a small fire extinguisher. Arrange your kitchen with frequently used items on lower shelves to avoid the need to stand on a stepstool to reach them. Make sure countertops are well-lit to avoid injuries while cutting and preparing food. In the Bathroom    Use a non-slip mat or decals in the tub and shower, since wet, soapy tile or porcelain surfaces are extremely slippery. Make sure bathroom rugs are non-skid or tape them firmly to the floor. Bathtubs should have at least one, preferably two, grab bars, firmly attached to structural supports in the wall. (Do not use built-in soap holders or glass shower doors as grab bars.)    Tub seats fitted with non-slip material on the legs allow you to wash sitting down. For people with limited mobility, bathtub transfer benches allow you to slide safely into the tub. Raised toilet seats and toilet safety rails are helpful for those with knee or hip problems. In the [facility]    Make sure you use a nightlight and that the area around your bed is clear of potential obstacles. Be careful with electric blankets and never go to sleep with a heating pad, which can cause serious burns even if on a low setting. Use fire-resistant mattress covers and pillows, and NEVER smoke in bed. Keep a phone next to the bed that is programmed to dial 911 at the push of a button. If you have a chronic condition, you may want to sign on with an automatic call-in service.  Typically the system includes a small pendant that connects directly to an emergency medical voice-response system. You should also make arrangements to stay in contact with someonefriend, neighbor, family memberon a regular schedule. Fire Prevention   According to the [employer] (Smoke Alarms for Every) [facility], senior citizens are one of the two highest risk groups for death and serious injuries due to residential fires. When cooking, wear short-sleeved items, never a bulky long-sleeved robe. The [facility]'s Safety Checklist for Older Consumers emphasizes the importance of checking basements, garages, workshops and storage areas for fire hazards, such as volatile liquids, piles of old rags or clothing and overloaded circuits. Never smoke in bed or when lying down on a couch or recliner chair. Small portable electric or kerosene heaters are responsible for many home fires and should be used cautiously if at all. If you do use one, be sure to keep them away from flammable materials. In case of fire, make sure you have a pre-established emergency exit plan. Have a professional check your fireplace and other fuel-burning appliances yearly. Helping Hands   Baby boomers entering the ponce years will continue to see the development of new products to help older adults live safely and independently in spite of age-related changes. Making Life More Livable  , by Michael Granados, lists over 1,000 products for \"living well in the mature years,\" such as bathing and mobility aids, household security devices, ergonomically designed knives and peelers, and faucet valves and knobs for temperature control. Medical supply stores and organizations are good sources of information about products that improve your quality of life and insure your safety. Last Reviewed: November 2009 Bhavin Gutierrez MD   Updated: 3/7/2011     ·        Learning About Living Perroy  What is a living will? A living will, also called a declaration, is a legal form.  It tells your family and your doctor your wishes when you can't speak for yourself. It's used by the health professionals who will treat you as you near the end of your life or if you get seriously hurt or ill. If you put your wishes in writing, your loved ones and others will know what kind of care you want. They won't need to guess. This can ease your mind and be helpful to others. And you can change or cancel your living will at any time. A living will is not the same as an estate or property will. An estate will explains what you want to happen with your money and property after you die. How do you use it? A living will is used to describe the kinds of treatment or life support you want as you near the end of your life or if you get seriously hurt or ill. Keep these facts in mind about living moctezuma. Your living will is used only if you can't speak or make decisions for yourself. Most often, one or more doctors must certify that you can't speak or decide for yourself before your living will takes effect. If you get better and can speak for yourself again, you can accept or refuse any treatment. It doesn't matter what you said in your living will. Some states may limit your right to refuse treatment in certain cases. For example, you may need to clearly state in your living will that you don't want artificial hydration and nutrition, such as being fed through a tube. Is a living will a legal document? A living will is a legal document. Each state has its own laws about living moct[place]. And a living will may be called something else in your state. Here are some things to know about living moctezuma. You don't need an  to complete a living will. But legal advice can be helpful if your state's laws are unclear. It can also help if your health history is complicated or your family can't agree on what should be in your living will. You can change your living will at any time.  Some people find that their wishes about end-of-life care change as their health changes. If you make big changes to your living will, complete a new form. If you move to another state, make sure that your living will is legal in the state where you now live. In most cases, doctors will respect your wishes even if you have a form from a different state. You might use a universal form that has been approved by many states. This kind of form can sometimes be filled out and stored online. Your digital copy will then be available wherever you have a connection to the internet. The doctors and nurses who need to treat you can find it right away. Your state may offer an online registry. This is another place where you can store your living will online. It's a good idea to get your living will notarized. This means using a person called a wali to watch two people sign, or witness, your living will. What should you know when you create a living will? Here are some questions to ask yourself as you make your living will:  Do you know enough about life support methods that might be used? If not, talk to your doctor so you know what might be done if you can't breathe on your own, your heart stops, or you can't swallow. What things would you still want to be able to do after you receive life-support methods? Would you want to be able to walk? To speak? To eat on your own? To live without the help of machines? Do you want certain Druze practices performed if you become very ill? If you have a choice, where do you want to be cared for? In your home? At a hospital or nursing home? If you have a choice at the end of your life, where would you prefer to die? At home? In a hospital or nursing home? Somewhere else? Would you prefer to be buried or cremated? Do you want your organs to be donated after you die? What should you do with your living will? Make sure that your family members and your health care agent have copies of your living will (also called a declaration). Give your doctor a copy of your living will. Ask him or her to keep it as part of your medical record. If you have more than one doctor, make sure that each one has a copy. Put a copy of your living will where it can be easily found. For example, some people may put a copy on their refrigerator door. If you are using a digital copy, be sure your doctor, family members, and health care agent know how to find and access it. Where can you learn more? Go to https://[email or web address].[email or web address] org and sign in to your [employer] account. Enter F452 in the [facility] box to learn more about \"Learning About Living Perroy. \"     If you do not have an account, please click on the \"Sign Up Now\" link. Current as of: July 17, 2020               Content Version: 12.8  © 2006-2021 [employer]. Care instructions adapted under license by [facility] ([facility]). If you have questions about a medical condition or this instruction, always ask your healthcare professional. [place] [address] any warranty or liability for your use of this information. ·        Learning About Medical Power of   What is a medical power of ? A medical power of , also called a durable power of  for health care, is one type of the legal forms called advance directives. It lets you name the person you want to make treatment decisions for you if you can't speak or decide for yourself. The person you choose is called your health care agent. This person is also called a health care proxy or health care surrogate. A medical power of  may be called something else in your state. How do you choose a health care agent? Choose your health care agent carefully. This person may or may not be a family member. Talk to the person before you make your final decision. Make sure he or she is comfortable with this responsibility.   It's a good idea to choose someone who:  Is at least 18 years old.  Yojana Hernandezunas you well and understands what makes life meaningful for you. Understands your Moravian and moral values. Will do what you want, not what he or she wants. Will be able to make difficult choices at a stressful time. Will be able to refuse or stop treatment, if that is what you would want, even if you could die. Will be firm and confident with health professionals if needed. Will ask questions to get needed information. Lives near you or agrees to travel to you if needed. Your family may help you make medical decisions while you can still be part of that process. But it's important to choose one person to be your health care agent in case you aren't able to make decisions for yourself. If you don't fill out the legal form and name a health care agent, the decisions your family can make may be limited. A health care agent may be called something else in your state. Who will make decisions for you if you don't have a health care agent? If you don't have a health care agent or a living will, you may not get the care you want. Decisions may be made by family members who disagree about your medical care. Or decisions may be made by a medical professional who doesn't know you well. In some cases, a  makes the decisions. When you name a health care agent, it is very clear who has the power to make health decisions for you. How do you name a health care agent? You name your health care agent on a legal form. This form is usually called a medical power of . Ask your hospital, state bar association, or office on aging where to find these forms. You must sign the form to make it legal. Some states require you to get the form notarized. This means that a person called a  watches you sign the form and then he or she signs the form. Some states also require that two or more witnesses sign the form. Be sure to tell your family members and doctors who your health care agent is. Where can you learn more? Go to https://chpepiceweb.[email or web address] org and sign in to your [employer] account. Enter [id number] in the [facility] box to learn more about \"Learning About [address]. \"     If you do not have an account, please click on the \"Sign Up Now\" link. Current as of: July 17, 2020               Content Version: 12.8  © 2006-2021 Healthwise, Incorporated. Care instructions adapted under license by [facility] ([facility]). If you have questions about a medical condition or this instruction, always ask your healthcare professional. [address] any warranty or liability for your use of this information.     ·

## 2021-05-03 ENCOUNTER — OFFICE VISIT (OUTPATIENT)
Dept: FAMILY MEDICINE CLINIC | Age: 86
End: 2021-05-03
Payer: MEDICARE

## 2021-05-03 VITALS
DIASTOLIC BLOOD PRESSURE: 90 MMHG | BODY MASS INDEX: 25.31 KG/M2 | HEIGHT: 68 IN | OXYGEN SATURATION: 98 % | SYSTOLIC BLOOD PRESSURE: 144 MMHG | WEIGHT: 167 LBS | HEART RATE: 76 BPM

## 2021-05-03 DIAGNOSIS — Z13.220 LIPID SCREENING: ICD-10-CM

## 2021-05-03 DIAGNOSIS — R03.0 ELEVATED BP WITHOUT DIAGNOSIS OF HYPERTENSION: ICD-10-CM

## 2021-05-03 DIAGNOSIS — I48.20 CHRONIC ATRIAL FIBRILLATION (HCC): ICD-10-CM

## 2021-05-03 DIAGNOSIS — R05.3 CHRONIC COUGH: ICD-10-CM

## 2021-05-03 DIAGNOSIS — R73.9 HYPERGLYCEMIA: ICD-10-CM

## 2021-05-03 DIAGNOSIS — I87.2 VENOUS INSUFFICIENCY: ICD-10-CM
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
## 2021-05-03 DIAGNOSIS — C61 PROSTATE CANCER (HCC): ICD-10-CM

## 2021-05-03 PROCEDURE — 99214 OFFICE O/P EST MOD 30 MIN: CPT | Performed by: FAMILY MEDICINE

## 2021-05-03 PROCEDURE — 36415 COLL VENOUS BLD VENIPUNCTURE: CPT | Performed by: FAMILY MEDICINE

## 2021-05-03 NOTE — PROGRESS NOTES
Chief Complaint   Patient presents with    Other     pt is here for check up on chronic conditions on venous insufficiancy, a-fib, and hyperglycemia. pt has cough with allergies, pt has fallen 2 times in the last 4 months. pt has in home pt/ot for the last 9 weeks and is doing well. Wt Readings from Last 3 Encounters:   21 167 lb (75.8 kg)   21 167 lb (75.8 kg)   21 167 lb (75.8 kg)     BP Readings from Last 3 Encounters:   21 (!) 144/90   21 (!) 169/110   19 (!) 147/77      Lab Results   Component Value Date    LABA1C 5.6 2019    LABA1C 5.5 2017    LABA1C 5.7 2012       HPI:  Theodore Thomson is a 80 y.o. (: 1925) here today for    Patient has not had anymore falls since starting physical therapy. He has been having physical therapy for the last 9 weeks and believe that he will be discharged from home PT next week. He has gained a lot of strength and has been moving much better. He continues to use a walker. Discussed with patients blood pressure being elevated today it seems okay with his age. Afraid to start on a blood pressure medication due to the risk of him become hypotensive. He states that home he checks his blood pressure and it usually runs under 140/90. He does continue to have a cough. Seems that it is more allergy related. No medication that could possibly cause cough. Discussed if daughter and patient wanted to look into the cough more could do a chest xray. Daughter states she feels the cough is chronic and so they don't want to do anything about the cough    [x] Patient has completed an advance directive  [] Patient has NOT completed an advanced directive  [x] Patient has a documented healthcare surrogate  [] Patient does NOT have a documented healthcare surrogate  [] Discussed the importance of establishing and updating an advanced directive. Patient has questions at this time and those were answered.   [x] Discussed the Pupils are equal, round, and reactive to light. Neck:      Thyroid: No thyromegaly. Vascular: No JVD. Cardiovascular:      Rate and Rhythm: Normal rate. Rhythm regularly irregular. Heart sounds: Murmur present. Systolic murmur present with a grade of 2/6. Pulmonary:      Effort: Pulmonary effort is normal. No respiratory distress. Breath sounds: Normal breath sounds. Abdominal:      Palpations: Abdomen is soft. There is no hepatomegaly or splenomegaly. Tenderness: There is no abdominal tenderness. Musculoskeletal:      Right lower leg: No edema. Left lower leg: No edema. Skin:     General: Skin is warm and dry. Coloration: Skin is not pale. Findings: No erythema or rash. Comments: Turgor normal   Neurological:      Mental Status: He is oriented to person, place, and time. Psychiatric:         Mood and Affect: Mood normal.         Behavior: Behavior normal.         Thought Content: Thought content normal.         Judgment: Judgment normal.              ASSESSMENT PLAN      Diagnosis Orders   1. Fall, sequela     2. Hyperglycemia     3. Venous insufficiency  COMPREHENSIVE METABOLIC PANEL    CBC WITH AUTO DIFFERENTIAL   4. Chronic atrial fibrillation (HCC)  COMPREHENSIVE METABOLIC PANEL    CBC WITH AUTO DIFFERENTIAL   5. Lipid screening     6. Prostate cancer ([facility] [address]     7. Elevated BP without diagnosis of hypertension     8. Chronic cough     Patient has had many less falls since in physical therapy. No symptoms elevated sugar, not worried about tight control. Lower extremity swelling is doing well. His ABIs done by an outside company suggest moderate disease but he has no open areas is asymptomatic no other intervention. Blood pressure somewhat elevated but again not to a level that I believe needs treatment. He has an irregular rhythm but the rate is not too fast or too slow. Overall doing very well.   Daughter did not wish to look further into the cough, as explained if the pattern or associated symptoms change to let us know. Follow-up 6 months. Patient should call the office immediately with new or ongoing signs or symptoms or worsening, or proceed to the emergency room. No changes in past medical history, past surgical history, social history, or family history were noted during the patient encounter unless specifically listed above. All updates of past medical history, past surgical history, social history, or family history were reviewed personally by me during the office visit. All problems listed in the assessment are stable unless noted otherwise. Medication profile reviewed personally by me during the visit. Medication side effects and possible impairments from medications were discussed as applicable. This document was prepared by a combination of typing and transcription through a voice recognition software. Scribe attestation: Carolyn Soulier, RN, am scribing for and in the presence of Byron Hearn MD. Electronically signed by Jaspal Kingston RN on 5/3/2021 at 1:16 PM      Provider attestation:     I, Dr. Leigh Mendez, personally performed the services described in this documentation, as scribed by the above signed scribe in my presence, and it is both accurate and complete. I agree with the ROS and Past Histories independently gathered by the clinical support staff and the remaining scribed note accurately describes my personal service to the patient.       5/3/2021    1:33 PM

## 2021-05-04 LAB
A/G RATIO: 2.3 (ref 1.1–2.2)
ALBUMIN SERPL-MCNC: 4.6 G/DL (ref 3.4–5)
ALP BLD-CCNC: 54 U/L (ref 40–129)
ALT SERPL-CCNC: 10 U/L (ref 10–40)
ANION GAP SERPL CALCULATED.3IONS-SCNC: 14 MMOL/L (ref 3–16)
AST SERPL-CCNC: 20 U/L (ref 15–37)
BASOPHILS ABSOLUTE: 0.1 K/UL (ref 0–0.2)
BASOPHILS RELATIVE PERCENT: 0.7 %
BILIRUB SERPL-MCNC: 0.4 MG/DL (ref 0–1)
BUN BLDV-MCNC: 15 MG/DL (ref 7–20)
CALCIUM SERPL-MCNC: 9.2 MG/DL (ref 8.3–10.6)
CHLORIDE BLD-SCNC: 104 MMOL/L (ref 99–110)
CO2: 25 MMOL/L (ref 21–32)
CREAT SERPL-MCNC: 0.9 MG/DL (ref 0.8–1.3)
EOSINOPHILS ABSOLUTE: 0.3 K/UL (ref 0–0.6)
EOSINOPHILS RELATIVE PERCENT: 3.5 %
GFR AFRICAN AMERICAN: >60
GFR NON-AFRICAN AMERICAN: >60
GLOBULIN: 2 G/DL
GLUCOSE BLD-MCNC: 99 MG/DL (ref 70–99)
HCT VFR BLD CALC: 37.1 % (ref 40.5–52.5)
HEMOGLOBIN: 12.7 G/DL (ref 13.5–17.5)
LYMPHOCYTES ABSOLUTE: 2 K/UL (ref 1–5.1)
LYMPHOCYTES RELATIVE PERCENT: 27.2 %
MCH RBC QN AUTO: 32.4 PG (ref 26–34)
MCHC RBC AUTO-ENTMCNC: 34.3 G/DL (ref 31–36)
MCV RBC AUTO: 94.4 FL (ref 80–100)
MONOCYTES ABSOLUTE: 0.5 K/UL (ref 0–1.3)
MONOCYTES RELATIVE PERCENT: 7 %
NEUTROPHILS ABSOLUTE: 4.6 K/UL (ref 1.7–7.7)
NEUTROPHILS RELATIVE PERCENT: 61.6 %
PDW BLD-RTO: 13.6 % (ref 12.4–15.4)
PLATELET # BLD: 171 K/UL (ref 135–450)
PMV BLD AUTO: 8.1 FL (ref 5–10.5)
POTASSIUM SERPL-SCNC: 3.9 MMOL/L (ref 3.5–5.1)
RBC # BLD: 3.93 M/UL (ref 4.2–5.9)
SODIUM BLD-SCNC: 143 MMOL/L (ref 136–145)
TOTAL PROTEIN: 6.6 G/DL (ref 6.4–8.2)
WBC # BLD: 7.5 K/UL (ref 4–11)

## 2021-05-12 ENCOUNTER — HOSPITAL ENCOUNTER (EMERGENCY)
Age: 86
Discharge: ANOTHER HOSPITAL | End: 2021-05-13
Attending: EMERGENCY MEDICINE
Payer: MEDICARE

## 2021-05-12 DIAGNOSIS — R26.81 UNSTABLE GAIT: ICD-10-CM

## 2021-05-12 DIAGNOSIS — I65.22 INTERNAL CAROTID ARTERY OCCLUSION, LEFT: Primary | ICD-10-CM

## 2021-05-12 PROCEDURE — 99285 EMERGENCY DEPT VISIT HI MDM: CPT

## 2021-05-13 ENCOUNTER — APPOINTMENT (OUTPATIENT)
Dept: GENERAL RADIOLOGY | Age: 86
DRG: 064 | End: 2021-05-13
Attending: INTERNAL MEDICINE
Payer: MEDICARE

## 2021-05-13 ENCOUNTER — APPOINTMENT (OUTPATIENT)
Dept: CT IMAGING | Age: 86
End: 2021-05-13
Payer: MEDICARE

## 2021-05-13 ENCOUNTER — HOSPITAL ENCOUNTER (INPATIENT)
Age: 86
LOS: 2 days | Discharge: HOSPICE | DRG: 064 | End: 2021-05-15
Attending: INTERNAL MEDICINE | Admitting: HOSPITALIST
Payer: MEDICARE

## 2021-05-13 VITALS
BODY MASS INDEX: 25.46 KG/M2 | SYSTOLIC BLOOD PRESSURE: 151 MMHG | OXYGEN SATURATION: 97 % | RESPIRATION RATE: 19 BRPM | HEART RATE: 69 BPM | WEIGHT: 168 LBS | HEIGHT: 68 IN | TEMPERATURE: 98.1 F | DIASTOLIC BLOOD PRESSURE: 86 MMHG

## 2021-05-13 PROBLEM — I77.71 CAROTID ARTERY DISSECTION (HCC): Status: ACTIVE | Noted: 2021-05-13

## 2021-05-13 PROBLEM — I67.82 CHRONIC CEREBRAL ISCHEMIA: Status: ACTIVE | Noted: 2021-05-13

## 2021-05-13 PROBLEM — R41.82 ALTERED MENTAL STATE: Status: ACTIVE | Noted: 2021-05-13

## 2021-05-13 PROBLEM — S12.390A: Status: ACTIVE | Noted: 2021-05-13

## 2021-05-13 PROBLEM — Z86.73 HISTORY OF CEREBROVASCULAR ACCIDENT (CVA) IN ADULTHOOD: Status: ACTIVE | Noted: 2021-05-13

## 2021-05-13 LAB
A/G RATIO: 1.5 (ref 1.1–2.2)
ALBUMIN SERPL-MCNC: 4 G/DL (ref 3.4–5)
ALP BLD-CCNC: 56 U/L (ref 40–129)
ALT SERPL-CCNC: 11 U/L (ref 10–40)
AMMONIA: 14 UMOL/L (ref 16–60)
AMPHETAMINE SCREEN, URINE: NORMAL
ANION GAP SERPL CALCULATED.3IONS-SCNC: 9 MMOL/L (ref 3–16)
APTT: 40.6 SEC (ref 24.2–36.2)
APTT: 44.1 SEC (ref 24.2–36.2)
AST SERPL-CCNC: 19 U/L (ref 15–37)
BARBITURATE SCREEN URINE: NORMAL
BASOPHILS ABSOLUTE: 0.1 K/UL (ref 0–0.2)
BASOPHILS RELATIVE PERCENT: 0.9 %
BENZODIAZEPINE SCREEN, URINE: NORMAL
BILIRUB SERPL-MCNC: 0.3 MG/DL (ref 0–1)
BILIRUBIN URINE: NEGATIVE
BILIRUBIN URINE: NEGATIVE
BLOOD, URINE: ABNORMAL
BLOOD, URINE: NEGATIVE
BUN BLDV-MCNC: 15 MG/DL (ref 7–20)
CALCIUM SERPL-MCNC: 9.4 MG/DL (ref 8.3–10.6)
CANNABINOID SCREEN URINE: NORMAL
CHLORIDE BLD-SCNC: 105 MMOL/L (ref 99–110)
CLARITY: CLEAR
CLARITY: CLEAR
CO2: 27 MMOL/L (ref 21–32)
COCAINE METABOLITE SCREEN URINE: NORMAL
COLOR: YELLOW
COLOR: YELLOW
CREAT SERPL-MCNC: 1 MG/DL (ref 0.8–1.3)
EKG ATRIAL RATE: 66 BPM
EKG DIAGNOSIS: NORMAL
EKG Q-T INTERVAL: 414 MS
EKG QRS DURATION: 92 MS
EKG QTC CALCULATION (BAZETT): 443 MS
EKG R AXIS: -27 DEGREES
EKG T AXIS: 48 DEGREES
EKG VENTRICULAR RATE: 69 BPM
EOSINOPHILS ABSOLUTE: 0.4 K/UL (ref 0–0.6)
EOSINOPHILS RELATIVE PERCENT: 4.3 %
EPITHELIAL CELLS, UA: ABNORMAL /HPF (ref 0–5)
GFR AFRICAN AMERICAN: >60
GFR NON-AFRICAN AMERICAN: >60
GLOBULIN: 2.6 G/DL
GLUCOSE BLD-MCNC: 104 MG/DL (ref 70–99)
GLUCOSE URINE: NEGATIVE MG/DL
GLUCOSE URINE: NEGATIVE MG/DL
HCT VFR BLD CALC: 36 % (ref 40.5–52.5)
HEMOGLOBIN: 12.2 G/DL (ref 13.5–17.5)
INR BLD: 0.95 (ref 0.86–1.14)
INR BLD: 0.98 (ref 0.86–1.14)
KETONES, URINE: NEGATIVE MG/DL
KETONES, URINE: NEGATIVE MG/DL
LEUKOCYTE ESTERASE, URINE: NEGATIVE
LEUKOCYTE ESTERASE, URINE: NEGATIVE
LYMPHOCYTES ABSOLUTE: 2 K/UL (ref 1–5.1)
LYMPHOCYTES RELATIVE PERCENT: 24 %
Lab: NORMAL
MCH RBC QN AUTO: 32.4 PG (ref 26–34)
MCHC RBC AUTO-ENTMCNC: 33.9 G/DL (ref 31–36)
MCV RBC AUTO: 95.5 FL (ref 80–100)
METHADONE SCREEN, URINE: NORMAL
MICROSCOPIC EXAMINATION: NORMAL
MICROSCOPIC EXAMINATION: YES
MONOCYTES ABSOLUTE: 0.6 K/UL (ref 0–1.3)
MONOCYTES RELATIVE PERCENT: 7.7 %
NEUTROPHILS ABSOLUTE: 5.2 K/UL (ref 1.7–7.7)
NEUTROPHILS RELATIVE PERCENT: 63.1 %
NITRITE, URINE: NEGATIVE
NITRITE, URINE: NEGATIVE
OPIATE SCREEN URINE: NORMAL
OXYCODONE URINE: NORMAL
PDW BLD-RTO: 13.2 % (ref 12.4–15.4)
PH UA: 5.5
PH UA: 5.5 (ref 5–8)
PH UA: 7 (ref 5–8)
PHENCYCLIDINE SCREEN URINE: NORMAL
PLATELET # BLD: 171 K/UL (ref 135–450)
PMV BLD AUTO: 7.5 FL (ref 5–10.5)
POTASSIUM REFLEX MAGNESIUM: 3.7 MMOL/L (ref 3.5–5.1)
PRO-BNP: 2077 PG/ML (ref 0–449)
PROPOXYPHENE SCREEN: NORMAL
PROTEIN UA: NEGATIVE MG/DL
PROTEIN UA: NEGATIVE MG/DL
PROTHROMBIN TIME: 11 SEC (ref 10–13.2)
PROTHROMBIN TIME: 11.4 SEC (ref 10–13.2)
RBC # BLD: 3.77 M/UL (ref 4.2–5.9)
RBC UA: ABNORMAL /HPF (ref 0–4)
SODIUM BLD-SCNC: 141 MMOL/L (ref 136–145)
SPECIFIC GRAVITY UA: 1.01 (ref 1–1.03)
SPECIFIC GRAVITY UA: 1.01 (ref 1–1.03)
TOTAL PROTEIN: 6.6 G/DL (ref 6.4–8.2)
TROPONIN: <0.01 NG/ML
URINE REFLEX TO CULTURE: ABNORMAL
URINE TYPE: ABNORMAL
URINE TYPE: NORMAL
UROBILINOGEN, URINE: 0.2 E.U./DL
UROBILINOGEN, URINE: 0.2 E.U./DL
WBC # BLD: 8.2 K/UL (ref 4–11)
WBC UA: ABNORMAL /HPF (ref 0–5)

## 2021-05-13 PROCEDURE — 85610 PROTHROMBIN TIME: CPT

## 2021-05-13 PROCEDURE — 2580000003 HC RX 258: Performed by: HOSPITALIST

## 2021-05-13 PROCEDURE — 81003 URINALYSIS AUTO W/O SCOPE: CPT

## 2021-05-13 PROCEDURE — 84484 ASSAY OF TROPONIN QUANT: CPT

## 2021-05-13 PROCEDURE — 6370000000 HC RX 637 (ALT 250 FOR IP): Performed by: EMERGENCY MEDICINE

## 2021-05-13 PROCEDURE — 82746 ASSAY OF FOLIC ACID SERUM: CPT

## 2021-05-13 PROCEDURE — 70450 CT HEAD/BRAIN W/O DYE: CPT

## 2021-05-13 PROCEDURE — 80307 DRUG TEST PRSMV CHEM ANLYZR: CPT

## 2021-05-13 PROCEDURE — 71045 X-RAY EXAM CHEST 1 VIEW: CPT

## 2021-05-13 PROCEDURE — 82140 ASSAY OF AMMONIA: CPT

## 2021-05-13 PROCEDURE — 82306 VITAMIN D 25 HYDROXY: CPT

## 2021-05-13 PROCEDURE — 6360000004 HC RX CONTRAST MEDICATION: Performed by: EMERGENCY MEDICINE

## 2021-05-13 PROCEDURE — 93010 ELECTROCARDIOGRAM REPORT: CPT | Performed by: INTERNAL MEDICINE

## 2021-05-13 PROCEDURE — 93005 ELECTROCARDIOGRAM TRACING: CPT | Performed by: HOSPITALIST

## 2021-05-13 PROCEDURE — 81001 URINALYSIS AUTO W/SCOPE: CPT

## 2021-05-13 PROCEDURE — 70498 CT ANGIOGRAPHY NECK: CPT

## 2021-05-13 PROCEDURE — 80053 COMPREHEN METABOLIC PANEL: CPT

## 2021-05-13 PROCEDURE — 84443 ASSAY THYROID STIM HORMONE: CPT

## 2021-05-13 PROCEDURE — 36415 COLL VENOUS BLD VENIPUNCTURE: CPT

## 2021-05-13 PROCEDURE — 6370000000 HC RX 637 (ALT 250 FOR IP): Performed by: HOSPITALIST

## 2021-05-13 PROCEDURE — 83880 ASSAY OF NATRIURETIC PEPTIDE: CPT

## 2021-05-13 PROCEDURE — 87086 URINE CULTURE/COLONY COUNT: CPT

## 2021-05-13 PROCEDURE — 82607 VITAMIN B-12: CPT

## 2021-05-13 PROCEDURE — 85025 COMPLETE CBC W/AUTO DIFF WBC: CPT

## 2021-05-13 PROCEDURE — 1200000000 HC SEMI PRIVATE

## 2021-05-13 PROCEDURE — 85730 THROMBOPLASTIN TIME PARTIAL: CPT

## 2021-05-13 PROCEDURE — 84439 ASSAY OF FREE THYROXINE: CPT

## 2021-05-13 PROCEDURE — 93005 ELECTROCARDIOGRAM TRACING: CPT | Performed by: EMERGENCY MEDICINE

## 2021-05-13 PROCEDURE — 72125 CT NECK SPINE W/O DYE: CPT

## 2021-05-13 RX ORDER — ONDANSETRON 4 MG/1
4 TABLET, ORALLY DISINTEGRATING ORAL EVERY 8 HOURS PRN
Status: DISCONTINUED | OUTPATIENT
Start: 2021-05-13 | End: 2021-05-15 | Stop reason: HOSPADM

## 2021-05-13 RX ORDER — ASPIRIN 81 MG/1
324 TABLET, CHEWABLE ORAL ONCE
Status: COMPLETED | OUTPATIENT
Start: 2021-05-13 | End: 2021-05-13

## 2021-05-13 RX ORDER — ATORVASTATIN CALCIUM 10 MG/1
20 TABLET, FILM COATED ORAL ONCE
Status: COMPLETED | OUTPATIENT
Start: 2021-05-13 | End: 2021-05-13

## 2021-05-13 RX ORDER — LABETALOL HYDROCHLORIDE 5 MG/ML
10 INJECTION, SOLUTION INTRAVENOUS EVERY 10 MIN PRN
Status: DISCONTINUED | OUTPATIENT
Start: 2021-05-13 | End: 2021-05-15 | Stop reason: HOSPADM

## 2021-05-13 RX ORDER — SODIUM CHLORIDE 0.9 % (FLUSH) 0.9 %
10 SYRINGE (ML) INJECTION EVERY 12 HOURS SCHEDULED
Status: DISCONTINUED | OUTPATIENT
Start: 2021-05-13 | End: 2021-05-15 | Stop reason: HOSPADM

## 2021-05-13 RX ORDER — ONDANSETRON 2 MG/ML
4 INJECTION INTRAMUSCULAR; INTRAVENOUS EVERY 6 HOURS PRN
Status: DISCONTINUED | OUTPATIENT
Start: 2021-05-13 | End: 2021-05-15 | Stop reason: HOSPADM

## 2021-05-13 RX ORDER — SODIUM CHLORIDE 9 MG/ML
25 INJECTION, SOLUTION INTRAVENOUS PRN
Status: DISCONTINUED | OUTPATIENT
Start: 2021-05-13 | End: 2021-05-15 | Stop reason: HOSPADM

## 2021-05-13 RX ORDER — SENNA PLUS 8.6 MG/1
1 TABLET ORAL DAILY PRN
Status: DISCONTINUED | OUTPATIENT
Start: 2021-05-13 | End: 2021-05-15 | Stop reason: HOSPADM

## 2021-05-13 RX ORDER — ASPIRIN 81 MG/1
81 TABLET ORAL DAILY
Status: DISCONTINUED | OUTPATIENT
Start: 2021-05-13 | End: 2021-05-15 | Stop reason: HOSPADM

## 2021-05-13 RX ORDER — BISACODYL 10 MG
10 SUPPOSITORY, RECTAL RECTAL DAILY PRN
Status: DISCONTINUED | OUTPATIENT
Start: 2021-05-13 | End: 2021-05-15 | Stop reason: HOSPADM

## 2021-05-13 RX ORDER — SODIUM CHLORIDE 0.9 % (FLUSH) 0.9 %
10 SYRINGE (ML) INJECTION PRN
Status: DISCONTINUED | OUTPATIENT
Start: 2021-05-13 | End: 2021-05-15 | Stop reason: HOSPADM

## 2021-05-13 RX ORDER — ASPIRIN 300 MG/1
300 SUPPOSITORY RECTAL DAILY
Status: DISCONTINUED | OUTPATIENT
Start: 2021-05-13 | End: 2021-05-15 | Stop reason: HOSPADM

## 2021-05-13 RX ORDER — ATORVASTATIN CALCIUM 10 MG/1
20 TABLET, FILM COATED ORAL NIGHTLY
Status: DISCONTINUED | OUTPATIENT
Start: 2021-05-13 | End: 2021-05-15 | Stop reason: HOSPADM

## 2021-05-13 RX ADMIN — Medication 10 ML: at 21:30

## 2021-05-13 RX ADMIN — ASPIRIN 324 MG: 81 TABLET, CHEWABLE ORAL at 08:40

## 2021-05-13 RX ADMIN — ATORVASTATIN CALCIUM 20 MG: 10 TABLET, FILM COATED ORAL at 21:30

## 2021-05-13 RX ADMIN — ATORVASTATIN CALCIUM 20 MG: 10 TABLET, FILM COATED ORAL at 08:39

## 2021-05-13 RX ADMIN — IOPAMIDOL 75 ML: 755 INJECTION, SOLUTION INTRAVENOUS at 07:18

## 2021-05-13 NOTE — ED NOTES
Call placed to transfer center for consult with hospitalist at [facility] at [phone] per request from Dr. Karyn Meza.      Katty Carter  05/13/21 1305

## 2021-05-13 NOTE — ED NOTES
Pt ambulated with this RN, pt had shuffled gait, which is abnormal per family.      Flash Hauser MD notified      Joselin Arnold RN  05/13/21 9742

## 2021-05-13 NOTE — ED NOTES
Dr. Olga Yoo form [facility] returned call at [phone].      Shereen Carter  05/13/21 7098

## 2021-05-13 NOTE — ED NOTES
8096- Call placed to Stroke team      Call was returned  And spoke to Dr. Dana Salazar     Call placed to transfer center for neuro surgery, Neuro states that it would Vascular neuro. Stroke team called back again and stated pt can go to [facility]. See Dr. Jcarlos Perez note.      Cancelled Vascular neuro consult     Requested [facility] to be updated      [employer]  05/13/21 [address]  05/13/21 3632

## 2021-05-13 NOTE — ED NOTES
Patient provided with food and drink, denies pain/discomfort at this time.       Salvador Angel RN  05/13/21 9915

## 2021-05-13 NOTE — PROGRESS NOTES
4 Eyes Skin Assessment     The patient is being assess for   Admission    I agree that 2 RN's have performed a thorough Head to Toe Skin Assessment on the patient. ALL assessment sites listed below have been assessed. Areas assessed by both nurses:   [x]   Head, Face, and Ears   [x]   Shoulders, Back, and Chest, Abdomen  [x]   Arms, Elbows, and Hands   [x]   Coccyx, Sacrum, and Ischium  [x]   Legs, Feet, and Heels        Redness b/l heels, buttocks blanchable. Scattered abrasions (scabs)        Co-signer eSignature: Electronically signed by Diego Lobo RN on 5/13/21 at 4:46 PM EDT    Does the Patient have Skin Breakdown?   No          Blair Prevention initiated:  Yes   Wound Care Orders initiated:  No      [facility] nurse consulted for Pressure Injury (Stage 3,4, Unstageable, DTI, NWPT, Complex wounds)and New or Established Ostomies:  NA      Primary Nurse eSignature: Electronically signed by Nirmala Sprague RN on 5/13/21 at 5:09 PM EDT

## 2021-05-13 NOTE — PROGRESS NOTES
Occupational Therapy/ Physical Therapy    Acknowledged OT/PT order however the pt is transferring to [facility] per the pts chart.   Fátima Dozier OTR/L [id number]

## 2021-05-13 NOTE — ED PROVIDER NOTES
[address] ED    CHIEF COMPLAINT  Fall (fell yesterday [phone], and somewhat altered since.)       HISTORY OF PRESENT ILLNESS  Moe Mcgraw is a 80 y.o. male who presents to the ED after a fall. Presents from home by EMS. Fall occurred around 1030 this morning. Since then, has been acting differently. Picking at self. Staring out window. Typically, patient is able to care for himself without difficulty but lives with family at home. Apparently, patient wet the bed today, which is new for him as well. Patient is overall a poor historian. Complains of pain at the back of his head. Otherwise does not contribute meaningfully to the history. No other complaints, modifying factors or associated symptoms. I have reviewed the following from the nursing documentation:    Past Medical History:   Diagnosis Date    Arthritis     Atrial fibrillation ([address]     CVA (cerebral vascular accident) ([address]    Jason Reaper Glaucoma     [place] (hard of hearing)     Hyperglycemia     Hyperlipidemia     Macular degeneration     left eye    Prostate cancer ([address] 2007    Venous insufficiency      Past Surgical History:   Procedure Laterality Date    CHOLECYSTECTOMY      GLAUCOMA SURGERY      HIP ARTHROPLASTY      bilateral    NOSE SURGERY       Family History   Problem Relation Age of Onset    Cancer Mother     Cancer Father      Social History     Socioeconomic History    Marital status:      Spouse name: Not on file    Number of children: Not on file    Years of education: Not on file    Highest education level: Not on file   Occupational History    Not on file   Social Needs    Financial resource strain: Not on file    Food insecurity     Worry: Not on file     Inability: Not on file    Transportation needs     Medical: Not on file     Non-medical: Not on file   Tobacco Use    Smoking status: Never Smoker    Smokeless tobacco: Never Used   Substance and Sexual Activity    Alcohol use:  No deformity. Eyes: PERRL. EOMI. Heart/Chest: Irregularly irregular rhythm. Bradycardic rate. Lungs: Respirations unlabored. CTAB. Good air exchange. Abdomen: Soft. Non-tender. Non-distended. No rebound or guarding. Musculoskeletal: No extremity edema. No deformity. No tenderness in the extremities. All extremities neurovascularly intact. Skin: Warm and dry. No acute rashes. Neurological: Alert. CN II-XII intact. Strength 5/5 bilateral upper and lower extremities. Sensation intact to light touch. Gait not assessed. Psychiatric: Mood/affect: normal      LABS  I have reviewed all labs for this visit.    Results for orders placed or performed during the hospital encounter of 05/12/21   CBC Auto Differential   Result Value Ref Range    WBC 8.2 4.0 - 11.0 K/uL    RBC 3.77 (L) 4.20 - 5.90 M/uL    Hemoglobin 12.2 (L) 13.5 - 17.5 g/dL    Hematocrit 36.0 (L) 40.5 - 52.5 %    MCV 95.5 80.0 - 100.0 fL    MCH 32.4 26.0 - 34.0 pg    MCHC 33.9 31.0 - 36.0 g/dL    RDW 13.2 12.4 - 15.4 %    Platelets 716 541 - 533 K/uL    MPV 7.5 5.0 - 10.5 fL    Neutrophils % 63.1 %    Lymphocytes % 24.0 %    Monocytes % 7.7 %    Eosinophils % 4.3 %    Basophils % 0.9 %    Neutrophils Absolute 5.2 1.7 - 7.7 K/uL    Lymphocytes Absolute 2.0 1.0 - 5.1 K/uL    Monocytes Absolute 0.6 0.0 - 1.3 K/uL    Eosinophils Absolute 0.4 0.0 - 0.6 K/uL    Basophils Absolute 0.1 0.0 - 0.2 K/uL   Comprehensive Metabolic Panel w/ Reflex to MG   Result Value Ref Range    Sodium 141 136 - 145 mmol/L    Potassium reflex Magnesium 3.7 3.5 - 5.1 mmol/L    Chloride 105 99 - 110 mmol/L    CO2 27 21 - 32 mmol/L    Anion Gap 9 3 - 16    Glucose 104 (H) 70 - 99 mg/dL    BUN 15 7 - 20 mg/dL    CREATININE 1.0 0.8 - 1.3 mg/dL    GFR Non-African American >60 >60    GFR African American >60 >60    Calcium 9.4 8.3 - 10.6 mg/dL    Total Protein 6.6 6.4 - 8.2 g/dL    Albumin 4.0 3.4 - 5.0 g/dL    Albumin/Globulin Ratio 1.5 1.1 - 2.2    Total Bilirubin 0.3 0.0 - 1.0 (Bazett) 443 ms    R Axis -27 degrees    T Axis 48 degrees    Diagnosis       Atrial fibrillationSeptal infarct , age undeterminedAbnormal ECGNo significant change was foundWhen compared with ECG of10.24. 12Confirmed by Khadijah Landin MD, [address] ([id number]) on 5/13/2021 7:45:50 AM       RADIOLOGY  I have reviewed all radiographic studies for this visit. CTA HEAD NECK W CONTRAST   Preliminary Result   1. Abrupt occlusion of the left internal carotid artery at its origin likely   secondary to an acute left internal carotid artery dissection. 2. Reconstitution of the left internal carotid artery terminus with otherwise   complete occlusion of the intracranial left internal carotid artery. 3. Occlusion of the A2 branch of the left internal carotid artery, of   uncertain chronicity but concerning for acute thrombus. The above findings were discussed with Dr. Cher Arias at 7:45 a.m. on   05/13/2021. CT HEAD WO CONTRAST   Final Result   1. Stable chronic ischemic changes without acute hemorrhage or definite   evidence for acute ischemia. 2. Left sphenoid sinus disease could be acute or chronic. CT CERVICAL SPINE WO CONTRAST   Final Result   1. Questionable acute less than 50% compression fracture of C4 with   suboptimal evaluation due to technique. Degenerative changes with grade 1   spondylolistheses. 2. Other findings as described. ECG  EKG interpreted by myself. Rate: 69  Rhythm: atrial fibrillation  Axis: -27  Intervals: within normal limits  ST Segments: no acute abnormality  T waves: no acute abnormality  Comparison: Compared to 5/21/14, there is no significant change  Impression: a-fib with septal infarct seen on or before 5/21/14       ED COURSE/MDM  Patient seen and evaluated. Old records reviewed. Labs and imaging reviewed and results discussed with patient/family to extent possible. This is a 68-year-old male who presents with gait instability.   On arrival the patient is afebrile and nontoxic in appearance with otherwise reassuring vital signs. He is initially slow to answer questions however his initial neurological exam is otherwise nonfocal.  Patient underwent noncontrast head CT that showed stable chronic ischemic changes without acute hemorrhage or definite evidence for acute ischemia. Does show left sphenoid sinus disease, acute versus chronic. Patient without respiratory complaints. I did obtain a CT of the cervical spine that showed questionable acute less than 50% compression fracture of C4 with suboptimal evaluation due to technique. Also noted was degenerative changes with grade 1 spondylolisthesis. Renal panel no significant electrolyte abnormalities or creatinine elevation. Troponin within normal limits. BNP is elevated at 2100. U tox negative. CBC mild anemia, clinically insignificant. Otherwise unremarkable. Patient's mental status improved with time in the emergency department. We did attempt to ambulate the patient as he normally ambulates without difficulty. Here he has a shuffling gait and cannot ambulate without assistance. Will transfer to [facility] for further evaluation and treatment at the request of the patient's family. [facility] requested CTA head and neck. This was obtained and revealed abrupt occlusion of the left internal carotid artery at its origin likely secondary to an acute left internal carotid artery dissection. Also noted was reconstitution of the left internal carotid artery terminus with otherwise complete occlusion of the intracranial left internal carotid artery. Finally there was an occlusion of the A2 branch of the left internal carotid artery of uncertain chronicity but concerning for acute thrombus. Discussed with stroke team and neurovascular surgery. No indication for any immediate intervention. No indication for tPA as symptoms started well past 4.5 hrs of LKW.  Full dose ASA and start statin per stroke team.

## 2021-05-13 NOTE — H&P
HOSPITALISTS HISTORY AND PHYSICAL    5/13/2021 5:12 PM    Patient Information:  Eyad Seals is a 80 y.o. male [id number]  PCP:  Lindsey Horan MD (Tel: [phone] )    Chief complaint:  No chief complaint on file. History of Present Illness:  Courtney Charles is a 80 y.o. male who presented to [facility] ED per EMS to be evaluated for a fall which occurred greater than 4 hours PTA. Despite advanced age, family reports that patient is typically able to care for himself and still lives at home. They report that he has had altered mental status since the fall including less conversation, blank stare, and urinary incontinence. CT scan was performed at OSH and notable for acute left ICA dissection as well as 50% compression fracture C4; mention was also made of possible acute left sphenoid sinusitis. Stroke team and neurovascular team were consulted by ED attending and felt that patient was not a candidate for intervention. Labs were notable for BNP 2077 and mild anemia. Request was made to transfer patient to [facility] to provide neurology consultation. He did receive ASA and statin prior to transport. History obtained from patient and review of Epic chart        REVIEW OF SYSTEMS: Limited due to AMS; symptoms reported by family members  Constitutional: Negative for fever,chills; positive weight loss and generalized weakness  ENT: Negative for rhinorrhea, epistaxis, hoarseness, and sore throat.   Respiratory: Negative for shortness of breath, wheezing, and cough  Cardiovascular: Negative for chest pain, palpitations, and peripheral edema; no orthopnea or PND  Gastrointestinal: Negative for N/V/D; no hematemesis, hematochezia, or melena; no anorexi  Genitourinary: Positive urinary incontinence PTA; negative for dysuria, frequency, hesitancy, and urgency  Hematologic/Lymphatic: Negative for bleeding tendency, excessive bruising, and enlarged LN  Musculoskeletal: Negative for myalgias and arthalgias; family reports patient able to ambulate without difficulty  Neurologic: Negative for LOC, seizure activity, paresthesias, dysarthria, vertigo, and gait disturbance per family  Skin: Negative for itching,rash  Psychiatric: Negative for depression,anxiety per family  Endocrine: Negative for polyuria/polydipsia/polyphagia; no heat/cold intolerance    Past Medical History:   has a past medical history of Arthritis, Atrial fibrillation ([facility] [address] CVA (cerebral vascular accident) ([facility] [address] Glaucoma, [place] (hard of hearing), Hyperglycemia, Hyperlipidemia, Macular degeneration, Prostate cancer ([facility] [address] and Venous insufficiency. Past Surgical History:   has a past surgical history that includes Cholecystectomy; Hip Arthroplasty; Glaucoma surgery; and Nose surgery. Medications:  No current facility-administered medications on file prior to encounter. Current Outpatient Medications on File Prior to Encounter   Medication Sig Dispense Refill    fexofenadine (ALLEGRA) 180 MG tablet Take 180 mg by mouth daily      Multiple Vitamins-Minerals (CENTRUM SILVER PO) Take by mouth      Multiple Vitamins-Minerals (VITEYES AREDS FORMULA PO) Take by mouth      acetaminophen (TYLENOL) 500 MG tablet Take 500 mg by mouth nightly         Allergies: Allergies   Allergen Reactions    Brimonidine Tartrate      Other reaction(s): low heart rate    Demerol Swelling        Social History:   reports that he has never smoked. He has never used smokeless tobacco. He reports that he does not drink alcohol or use drugs. Family History:  family history includes Cancer in his father and mother. Physical Exam:  BP (!) 160/93   Pulse 75   Temp 97.5 °F (36.4 °C) (Axillary)   Resp 18   Ht 5' 8\" (1.727 m)   Wt 162 lb 11.2 oz (73.8 kg)   SpO2 96%   BMI 24.74 kg/m²     General appearance:  Thin elderly male resting in bed, NAD  Eyes: Sclera clear without conjunctival injection; PERRLA; EOMI  ENT: Mucous membranes moist without thrush; normal dentition  Neck: Supple without meningismus; no goiter; no carotid bruit bilaterally  Cardiovascular: Irregularly irregular rhythm; 4/6 ALEXUS; trace peripheral edema; no JVD  Respiratory: No tachypnea; CTAB with adequate air exchange, no wheeze, rhonchi or rales  Gastrointestinal: Abdomen soft, non-tender, not distended; bowel sounds normal x4 quadrants; no masses/organomegaly appreciated  Musculoskeletal: Able to move arms, legs, head and neck without facial discomfort  Neurology: Alert but disoriented; minimal verbal response; exam limited due to patient's inability to cooperate; no seizure activity  Psychiatry: Unable to assess  Skin: Warm, dry, normal turgor, no rash  PV: 2/4 radial and dorsalis pedis bilaterally; brisk capillary refill    Labs:  CBC:   Lab Results   Component Value Date    WBC 8.2 05/13/2021    RBC 3.77 05/13/2021    HGB 12.2 05/13/2021    HCT 36.0 05/13/2021    MCV 95.5 05/13/2021    MCH 32.4 05/13/2021    MCHC 33.9 05/13/2021    RDW 13.2 05/13/2021     05/13/2021    MPV 7.5 05/13/2021     BMP:    Lab Results   Component Value Date     05/13/2021    K 3.7 05/13/2021     05/13/2021    CO2 27 05/13/2021    BUN 15 05/13/2021    CREATININE 1.0 05/13/2021    CALCIUM 9.4 05/13/2021    GFRAA >60 05/13/2021    GFRAA >60 10/24/2012    LABGLOM >60 05/13/2021    GLUCOSE 104 05/13/2021     XR CHEST PORTABLE    (Results Pending)         EKG:     EKG 12 Lead   Completed: 05/13/21 0043  Final result     Ventricular Rate 69 BPM QTc Calculation (Bazett) 443 ms   Atrial Rate 66 BPM R Axis -27 degrees   QRS Duration 92 ms T Axis 48 degrees   Q-T Interval 414 ms Diagnosis Atrial fibrillationSeptal infarct , age undeterminedAbnormal ECGNo significant change was foundWh          I visualized CXR images and EKG strips personally and agree with documented interpretation    Discussed case  with ED provider    Problem List  Principal Problem:    Altered mental state  Active Problems:    Carotid artery dissection (HCC)    Compression fracture of C4 vertebra (HCC)    PAF (paroxysmal atrial fibrillation) (HCC)    Venous insufficiency    Elevated BP without diagnosis of hypertension    Chronic cerebral ischemia    History of cerebrovascular accident (CVA) in adulthood  Resolved Problems:    * No resolved hospital problems.  *        Assessment/Plan:     Left ICA dissection with previous CVA  -Case discussed with stroke team and neuro vascular surgery prior to transfer, with both teams confirming immediate intervention unwarranted  -Admit to telemetry under CVA pathway with Q4H neuro checks scheduled overnight  -Aspiration precautions and fall protocol in place  -Initiate EC ASA 81 mg daily antiplatelet agent as well as high-dose statin therapy; FLP pending  -PRN Labetalol scheduled for extreme BP elevation; allow permissive HTN initially to ensure watershed blood flow remains intact  -ECHO scheduled for a.m. to assess for intramural thrombus/emboli potential  -MRI of the head scheduled for a.m. however doubt results would alter plan of care  -PT/OT/SLP consultation placed  -NEURO consult placed for further recommendations    PAF  -Patient currently rate controlled without antiarrhythmic, beta-blocker, or calcium channel blocker  -Admit to medical floor for continuous telemetry and pulse oximetry monitoring  -Patient evaluated per CHADS2 criteria and it is determined that they are NOT a recommended candidate for anticoagulation at this time    Altered mental status  -Suspect secondary to advanced age and cerebrovascular ischemia  -Fall risk protocol in place with bed alarm activated  -TSH/ free T4, vitamin D, B12/folate, prealbumin and A1c lab work ordered with results currently pending  -EKG without evidence of acute ischemia; ECHO scheduled to further assess cardiac function    Acute C4 vertebral fracture  -Patient does not appear to be in pain at time of exam  -Consult placed to orthopedics to ensure cervical collar/brace is not warranted  -Lidoderm patch to be applied to posterior C-spine  -As needed Tylenol      DVT prophylaxis-BLE SCDs rather than chemical anticoagulation due to presence of left ICA dissection  Code status-full code despite advanced age; no advanced directive in chart   Diet-n.p.o. pending bedside swallow eval  IV access-PIV established in ED      Admit as inpatient. I anticipate hospitalization spanning more than two midnights for investigation and treatment of the above medically necessary diagnoses. Please note that some part of this chart was generated using Dragon dictation software. Although every effort was made to ensure the accuracy of this automated transcription, some errors in transcription may have occurred inadvertently. If you may need any clarification, please do not hesitate to contact me through [facility].        Natalie Hammond MD    5/13/2021 5:12 PM

## 2021-05-14 ENCOUNTER — APPOINTMENT (OUTPATIENT)
Dept: MRI IMAGING | Age: 86
DRG: 064 | End: 2021-05-14
Attending: INTERNAL MEDICINE
Payer: MEDICARE

## 2021-05-14 LAB
A/G RATIO: 1.4 (ref 1.1–2.2)
ALBUMIN SERPL-MCNC: 3.8 G/DL (ref 3.4–5)
ALP BLD-CCNC: 52 U/L (ref 40–129)
ALT SERPL-CCNC: 9 U/L (ref 10–40)
ANION GAP SERPL CALCULATED.3IONS-SCNC: 10 MMOL/L (ref 3–16)
AST SERPL-CCNC: 22 U/L (ref 15–37)
BASOPHILS ABSOLUTE: 0.1 K/UL (ref 0–0.2)
BASOPHILS RELATIVE PERCENT: 0.8 %
BILIRUB SERPL-MCNC: 0.6 MG/DL (ref 0–1)
BUN BLDV-MCNC: 12 MG/DL (ref 7–20)
CALCIUM SERPL-MCNC: 9.6 MG/DL (ref 8.3–10.6)
CHLORIDE BLD-SCNC: 104 MMOL/L (ref 99–110)
CHOLESTEROL, TOTAL: 169 MG/DL (ref 0–199)
CO2: 24 MMOL/L (ref 21–32)
CREAT SERPL-MCNC: 0.8 MG/DL (ref 0.8–1.3)
EKG ATRIAL RATE: 81 BPM
EKG DIAGNOSIS: NORMAL
EKG Q-T INTERVAL: 396 MS
EKG QRS DURATION: 86 MS
EKG QTC CALCULATION (BAZETT): 427 MS
EKG R AXIS: -36 DEGREES
EKG T AXIS: 43 DEGREES
EKG VENTRICULAR RATE: 70 BPM
EOSINOPHILS ABSOLUTE: 0.1 K/UL (ref 0–0.6)
EOSINOPHILS RELATIVE PERCENT: 1.1 %
ESTIMATED AVERAGE GLUCOSE: 105.4 MG/DL
FOLATE: 18.92 NG/ML (ref 4.78–24.2)
GFR AFRICAN AMERICAN: >60
GFR NON-AFRICAN AMERICAN: >60
GLOBULIN: 2.8 G/DL
GLUCOSE BLD-MCNC: 118 MG/DL (ref 70–99)
HBA1C MFR BLD: 5.3 %
HCT VFR BLD CALC: 38.3 % (ref 40.5–52.5)
HDLC SERPL-MCNC: 51 MG/DL (ref 40–60)
HEMOGLOBIN: 12.7 G/DL (ref 13.5–17.5)
LDL CHOLESTEROL CALCULATED: 106 MG/DL
LYMPHOCYTES ABSOLUTE: 1.5 K/UL (ref 1–5.1)
LYMPHOCYTES RELATIVE PERCENT: 14.9 %
MCH RBC QN AUTO: 31.6 PG (ref 26–34)
MCHC RBC AUTO-ENTMCNC: 33.2 G/DL (ref 31–36)
MCV RBC AUTO: 95.2 FL (ref 80–100)
MONOCYTES ABSOLUTE: 0.5 K/UL (ref 0–1.3)
MONOCYTES RELATIVE PERCENT: 5 %
NEUTROPHILS ABSOLUTE: 7.9 K/UL (ref 1.7–7.7)
NEUTROPHILS RELATIVE PERCENT: 78.2 %
PDW BLD-RTO: 13.2 % (ref 12.4–15.4)
PLATELET # BLD: 170 K/UL (ref 135–450)
PMV BLD AUTO: 7.9 FL (ref 5–10.5)
POTASSIUM REFLEX MAGNESIUM: 3.7 MMOL/L (ref 3.5–5.1)
PREALBUMIN: 21.4 MG/DL (ref 20–40)
RBC # BLD: 4.03 M/UL (ref 4.2–5.9)
SODIUM BLD-SCNC: 138 MMOL/L (ref 136–145)
T4 FREE: 1.4 NG/DL (ref 0.9–1.8)
TOTAL PROTEIN: 6.6 G/DL (ref 6.4–8.2)
TRIGL SERPL-MCNC: 60 MG/DL (ref 0–150)
TSH REFLEX FT4: 1.04 UIU/ML (ref 0.27–4.2)
TSH SERPL DL<=0.05 MIU/L-ACNC: 1.53 UIU/ML (ref 0.27–4.2)
URINE CULTURE, ROUTINE: NORMAL
VITAMIN B-12: 453 PG/ML (ref 211–911)
VITAMIN D 25-HYDROXY: 34.3 NG/ML
VLDLC SERPL CALC-MCNC: 12 MG/DL
WBC # BLD: 10.1 K/UL (ref 4–11)

## 2021-05-14 PROCEDURE — 6370000000 HC RX 637 (ALT 250 FOR IP): Performed by: HOSPITALIST

## 2021-05-14 PROCEDURE — 97530 THERAPEUTIC ACTIVITIES: CPT

## 2021-05-14 PROCEDURE — 80061 LIPID PANEL: CPT

## 2021-05-14 PROCEDURE — 84134 ASSAY OF PREALBUMIN: CPT

## 2021-05-14 PROCEDURE — 2580000003 HC RX 258: Performed by: HOSPITALIST

## 2021-05-14 PROCEDURE — 99223 1ST HOSP IP/OBS HIGH 75: CPT | Performed by: PSYCHIATRY & NEUROLOGY

## 2021-05-14 PROCEDURE — 85025 COMPLETE CBC W/AUTO DIFF WBC: CPT

## 2021-05-14 PROCEDURE — 93010 ELECTROCARDIOGRAM REPORT: CPT | Performed by: INTERNAL MEDICINE

## 2021-05-14 PROCEDURE — 1200000000 HC SEMI PRIVATE

## 2021-05-14 PROCEDURE — 99221 1ST HOSP IP/OBS SF/LOW 40: CPT | Performed by: PHYSICIAN ASSISTANT

## 2021-05-14 PROCEDURE — 36415 COLL VENOUS BLD VENIPUNCTURE: CPT

## 2021-05-14 PROCEDURE — 6360000002 HC RX W HCPCS: Performed by: HOSPITALIST

## 2021-05-14 PROCEDURE — 83036 HEMOGLOBIN GLYCOSYLATED A1C: CPT

## 2021-05-14 PROCEDURE — 97166 OT EVAL MOD COMPLEX 45 MIN: CPT

## 2021-05-14 PROCEDURE — 84443 ASSAY THYROID STIM HORMONE: CPT

## 2021-05-14 PROCEDURE — 97162 PT EVAL MOD COMPLEX 30 MIN: CPT

## 2021-05-14 PROCEDURE — 80053 COMPREHEN METABOLIC PANEL: CPT

## 2021-05-14 PROCEDURE — 70551 MRI BRAIN STEM W/O DYE: CPT

## 2021-05-14 RX ORDER — LIDOCAINE 4 G/G
1 PATCH TOPICAL DAILY
Status: DISCONTINUED | OUTPATIENT
Start: 2021-05-14 | End: 2021-05-15 | Stop reason: HOSPADM

## 2021-05-14 RX ADMIN — ONDANSETRON 4 MG: 2 INJECTION INTRAMUSCULAR; INTRAVENOUS at 12:51

## 2021-05-14 RX ADMIN — Medication 10 ML: at 07:55

## 2021-05-14 RX ADMIN — ASPIRIN 81 MG: 81 TABLET, COATED ORAL at 07:56

## 2021-05-14 RX ADMIN — Medication 10 ML: at 20:33

## 2021-05-14 ASSESSMENT — PAIN SCALES - PAIN ASSESSMENT IN ADVANCED DEMENTIA (PAINAD)
TOTALSCORE: 0
CONSOLABILITY: 0
NEGVOCALIZATION: 0
FACIALEXPRESSION: 0
NEGVOCALIZATION: 0
CONSOLABILITY: 0
FACIALEXPRESSION: 0
FACIALEXPRESSION: 0

## 2021-05-14 NOTE — PROGRESS NOTES
Hospitalist Progress Note      PCP: Kailey Arriaga MD    Date of Admission: 5/13/2021        Subjective:   Patient is nonverbal, very weak. .. No fevers      Medications:  Reviewed    Infusion Medications    sodium chloride       Scheduled Medications    lidocaine  1 patch Transdermal Daily    sodium chloride flush  10 mL Intravenous 2 times per day    aspirin  81 mg Oral Daily    Or    aspirin  300 mg Rectal Daily    atorvastatin  20 mg Oral Nightly     PRN Meds: perflutren lipid microspheres, sodium chloride flush, sodium chloride, senna, bisacodyl, labetalol, ondansetron **OR** ondansetron      Intake/Output Summary (Last 24 hours) at 5/14/2021 1412  Last data filed at 5/14/2021 1031  Gross per 24 hour   Intake 340 ml   Output --   Net 340 ml       Exam:    BP (!) 153/95   Pulse 84   Temp 97.3 °F (36.3 °C) (Axillary)   Resp 18   Ht 5' 8\" (1.727 m)   Wt 158 lb 8.2 oz (71.9 kg)   SpO2 94%   BMI 24.10 kg/m²     General appearance: No apparent distress, nonverbal.  HEENT: Pupils equal, round, and reactive to light. Conjunctivae/corneas clear. Neck: Supple, with full range of motion. No jugular venous distention. Trachea midline. Respiratory:  Normal respiratory effort. Clear to auscultation, bilaterally without Rales/Wheezes/Rhonchi. Cardiovascular: Regular rate and rhythm with normal S1/S2 without murmurs, rubs or gallops. Abdomen: Soft, non-tender, non-distended with normal bowel sounds. Musculoskeletal: No clubbing, cyanosis or edema bilaterally. Full range of motion without deformity. Skin: Skin color, texture, turgor normal.  No rashes or lesions.   Neurologic: Nonfocal, no obvious focal deficits  Capillary Refill: Brisk,< 3 seconds   Peripheral Pulses: +2 palpable, equal bilaterally       Labs:   Recent Labs     05/13/21  0055 05/14/21  0611   WBC 8.2 10.1   HGB 12.2* 12.7*   HCT 36.0* 38.3*    170     Recent Labs     05/13/21  0055 05/14/21  0611    138   K 3.7 3.7    104   CO2 27 24   BUN 15 12   CREATININE 1.0 0.8   CALCIUM 9.4 9.6     Recent Labs     05/13/21 0055 05/14/21  0611   AST 19 22   ALT 11 9*   BILITOT 0.3 0.6   ALKPHOS 56 52     Recent Labs     05/13/21 0055 05/13/21  1738   INR 0.95 0.98     Recent Labs     05/13/21 0055 05/13/21  1738 05/13/21  2102   TROPONINI <0.01 <0.01 <0.01       Assessment/Plan:    -Suspected left internal carotid artery occlusion /dissection  -Acute encephalopathy--rule out CVA. .. Worsening confusion, patient is mute ? Aphasia-  -History of CVA  -Old C4 vertebral fracture--imaging reviewed by orthospine who fell fracture is not acute--- present since 2018 unstable--no intervention  -Fall at home  -Paroxysmal atrial fibrillation--in sinus rhythm-not on medication      Plan  MRI brain pending r/o CVA  Neurology consulted  PT and OT/ST consults  Continue antiplatelet and statin    DVT Prophylaxis: lovenox  Diet: DIET CARDIAC; Low Sodium (2 GM);  Dysphagia Soft and Bite-Sized  Code Status: Full Code      Disposition--needs SNF    Cecil Stuart MD

## 2021-05-14 NOTE — CONSULTS
times per day, Yomaira Kwan MD, 10 mL at 05/14/21 0755    sodium chloride flush 0.9 % injection 10 mL, 10 mL, Intravenous, PRN, Yomaira Kwan MD    0.9 % sodium chloride infusion, 25 mL, Intravenous, PRN, Yomaira Kwan MD    senna (SENOKOT) tablet 8.6 mg, 1 tablet, Oral, Daily PRN, Yomaira Kwan MD    bisacodyl (DULCOLAX) suppository 10 mg, 10 mg, Rectal, Daily PRN, Yomaira Kwan MD    aspirin EC tablet 81 mg, 81 mg, Oral, Daily, 81 mg at 05/14/21 0756 **OR** aspirin suppository 300 mg, 300 mg, Rectal, Daily, Yomaira Kwan MD    atorvastatin (LIPITOR) tablet 20 mg, 20 mg, Oral, Nightly, Yomaira Kwan MD, 20 mg at 05/13/21 2130    labetalol (NORMODYNE;TRANDATE) injection 10 mg, 10 mg, Intravenous, Q10 Min PRN, Yomaira Kwan MD    ondansetron (ZOFRAN-ODT) disintegrating tablet 4 mg, 4 mg, Oral, Q8H PRN **OR** ondansetron (ZOFRAN) injection 4 mg, 4 mg, Intravenous, Q6H PRN, Yomaira Kwan MD    Allergies:  Brimonidine tartrate and Demerol    Social History:    reports that he has never smoked. He has never used smokeless tobacco. He reports that he does not drink alcohol or use drugs. Family History:   Family History   Problem Relation Age of Onset   Baljit Mon Cancer Mother     Cancer Father        REVIEW OF SYSTEMS: Full ROS noted & scanned   CONSTITUTIONAL: Denies unexplained weight loss, fevers, chills or fatigue  NEUROLOGICAL: Denies unsteady gait or progressive weakness  MUSCULOSKELETAL: Denies joint swelling or redness  PSYCHOLOGICAL: Denies anxiety, depression   SKIN: Denies skin changes, delayed healing, rash, itching   HEMATOLOGIC: Denies easy bleeding or bruising  ENDOCRINE: Denies excessive thirst, urination, heat/cold  RESPIRATORY: Denies current dyspnea, cough  GI: Denies nausea, vomiting, diarrhea   : Denies bowel or bladder issues       PHYSICAL EXAM:    Vitals: Blood pressure (!) 153/95, pulse 84, temperature 97.3 °F (36.3 °C), temperature source Axillary, resp.  rate 18, gross instability. There are no rashes, ulcerations or lesions. Strength and tone are normal.    Diagnostic Testing:  CT scan of the cervical spine that was obtained on 5/13/2021 was reviewed and was also reviewed by Dr. Saad Hernandez who felt that the questionable C4 fracture, which was compared to the CT scan from August 2018, was old and unchanged. Impression:   Old C4 compression fracture      Plan:      Treatment plan was discussed with Dr. Saad Hernandez including observation and cervical bracing. Since the patient with help from the family members states that his neck pain is minimal and he has had no weakness in his upper extremities with movement and with fine motor movements he may continue without cervical bracing. If the patient has pain in the future a cervical collar could be ordered to help with pain.     · Follow Up: As needed

## 2021-05-14 NOTE — PROGRESS NOTES
Occupational Therapy   Occupational Therapy Initial Assessment/Treatment   Date: 2021   Patient Name: Jacque Huerta  MRN: [id number]     : 1925    Date of Service: 2021    Discharge Recommendations:  Continue to assess pending progress       Assessment   Performance deficits / Impairments: Decreased functional mobility ; Decreased safe awareness;Decreased balance;Decreased coordination;Decreased ADL status; Decreased cognition;Decreased posture;Decreased ROM; Decreased endurance;Decreased high-level IADLs;Decreased strength;Decreased fine motor control  Assessment: Pt functioning below baseline needing Ax2 and poor command following. Per chart review, pt was Mod I with ambulation using RW, lives with family. Admitted for AMS/confusion. During eval/tx, pt followed commands 50% of the time, no verbal communication or indication of yes/no. R lateral lean when sitting EOB. Pt required MOD A x 2 in order to complete stand to RW. Attempted to take steps with RW, PT navigating RW, OT assisting pt with verbal cuing and tactile cues to LEs, however, pt unable to take steps. Pt was then positioned back to bed. Pt provided with positioning in bed, due to pt rolling to R side, pillows placed to facilitate midline positioning. Pt will benefit from increased skilled rehab in order to return to PLOF  Treatment Diagnosis: Weakness  Prognosis: Fair  Decision Making: Medium Complexity  OT Education: OT Role;Transfer Training;ADL Adaptive Strategies; Family Education  REQUIRES OT FOLLOW UP: Yes  Activity Tolerance  Activity Tolerance: Treatment limited secondary to decreased cognition  Activity Tolerance: /90, O2 95%, HR 84  Safety Devices  Safety Devices in place: Yes  Type of devices: Nurse notified; Bed alarm in place;Call light within reach; Left in bed           Patient Diagnosis(es): There were no encounter diagnoses.      has a past medical history of Arthritis, Atrial fibrillation ([facility] [address] CVA (cerebral vascular accident) ([facility] [address] Glaucoma, [place] (hard of hearing), Hyperglycemia, Hyperlipidemia, Macular degeneration, Prostate cancer ([facility] [address] and Venous insufficiency. has a past surgical history that includes Cholecystectomy; Hip Arthroplasty; Glaucoma surgery; and Nose surgery. Treatment Diagnosis: Weakness      Restrictions  Restrictions/Precautions  Restrictions/Precautions: Fall Risk, Up as Tolerated  Position Activity Restriction  Other position/activity restrictions: Fall Risk, confused    Subjective   General  Patient assessed for rehabilitation services?: Yes  Family / Caregiver Present: No  Patient Currently in Pain: No  Vital Signs  Pulse: 95  BP: (!) 136/90  MAP (mmHg): 105  Patient Currently in Pain: No  Oxygen Therapy  SpO2: 92 %  Social/Functional History  Social/Functional History  Additional Comments: non-verbal and poor command following. Per chart review, pt lives with family with minimal assist and uses a walker. Objective        Orientation  Overall Orientation Status: Impaired(unable to formally assess due to pt's cognition)     Balance  Sitting Balance: Moderate assistance(Pt demos heavy lateral lean to R side)  Standing Balance: Dependent/Total(MOD A x 2)  Standing Balance  Time: 1 minute  Activity: transfer, pt demos ability to push RW forward, however, inability to take steps into walker  Comment: with RW  Functional Mobility  Functional - Mobility Device: Rolling Walker  Activity: Other  Assist Level: Dependent/Total(MOD A x 2, cues provided to take steps, however, pt would only push walker forward)  ADL  UE Dressing: Maximum assistance(management of hospital gown)  Tone RUE  RUE Tone: Normotonic  Tone LUE  LUE Tone: Normotonic  Coordination  Movements Are Fluid And Coordinated: No  Coordination and Movement description: Decreased speed;Decreased accuracy; Right UE        Transfers  Sit to stand:  Moderate assistance;2 Person assistance  Stand to sit: Moderate assistance;2 Person assistance Cognition  Overall Cognitive Status: Exceptions  Arousal/Alertness: Inconsistent responses to stimuli  Following Commands: Inconsistently follows commands  Attention Span: Difficulty attending to directions; Difficulty dividing attention  Memory: Decreased short term memory;Decreased long term memory  Safety Judgement: Decreased awareness of need for safety;Decreased awareness of need for assistance  Problem Solving: Decreased awareness of errors  Insights: Not aware of deficits  Initiation: Requires cues for all  Sequencing: Requires cues for all        Sensation  Overall Sensation Status: (unable to formally assess due to pt being non verbal and poor cognition)        LUE PROM (degrees)  LUE PROM: WFL  LUE AROM (degrees)  LUE AROM : WFL  Left Hand PROM (degrees)  Left Hand PROM: WFL  Left Hand AROM (degrees)  Left Hand AROM: WFL  RUE PROM (degrees)  RUE General PROM: limited ROM  RUE AROM (degrees)  RUE General AROM: limited to  degrees  LUE Strength  LUE Strength Comment: unable to do formall MMT due to pt's cognition  RUE Strength  RUE Strength Comment: unable to do formall MMT due to pt's cognition                   Plan   Plan  Times per week: 3-5x  Current Treatment Recommendations: Strengthening, Endurance Training, Self-Care / ADL, Balance Training, Functional Mobility Training, Safety Education & Training  AM-PAC Score        AM-[facility] Inpatient Daily Activity Raw Score: 6 (05/14/21 1528)  AM-PAC Inpatient ADL T-Scale Score : 17.07 (05/14/21 1528)  ADL Inpatient CMS 0-100% Score: 100 (05/14/21 1528)  ADL Inpatient CMS G-Code Modifier : CN (05/14/21 1528)    Goals  Short term goals  Time Frame for Short term goals: 7 days 5/21/21  Short term goal 1: Pt will complete BSC transfer from bed with MOD A x 1  Short term goal 2: Pt will complete grooming and hygiene while seated with MOD A  Short term goal 3: Pt will maintain EOB sitting balance with MIN A  Patient Goals   Patient goals : Pt unable to state goal due to cognition       Therapy Time   Individual Concurrent Group Co-treatment   Time In 1421         Time Out 1454         Minutes 33         Timed Code Treatment Minutes: 23 Minutes(10 minutes for eval)       Elian Sotomayor, OT

## 2021-05-14 NOTE — PROGRESS NOTES
Speech Language Pathology  Attempted Speech and Bedside Swallow Evaluations    SLP received consult and completed chart review. RN okays SLP entry into pt's room. Pt will awaken momentarily and falls back asleep within a few seconds. Pt not following commands to swallow his saliva. Pt too lethargic at this time for evaluation. Please contact SLP later this date if he becomes more alert and is able to participate in evaluations. Of note, pt has been on thickened liquids in the past, per daughter at bedside. Nimo Zhao, [address] [place] [phone]  Speech-Language Pathologist

## 2021-05-14 NOTE — PROGRESS NOTES
Physical Therapy  PT/OT order received. Pt on bedrest and has ortho consult. Will hold therapy evaluation until cleared for mobility.      Parul Christian, PT, DPT  Cecilia Souza, OT

## 2021-05-14 NOTE — FLOWSHEET NOTE
Pt's daughter was in room at the time, she explained that pt had a fall and seems to have stroke. She said that pt has a document at home and will make them available at her next visit. 05/14/21 1052   Encounter Summary   Services provided to: Family; Patient not available   Referral/Consult From: [facility]   (5/14 AD discussion)   Complexity of Encounter Low   Length of Encounter 15 minutes   Advance Directives (For Healthcare)   Healthcare Directive No, patient does not have an advance directive for healthcare treatment   Advance Directives Unable to complete

## 2021-05-14 NOTE — ACP (ADVANCE CARE PLANNING)
Advance Care Planning     General Advance Care Planning (ACP) Conversation    Date of Conversation: 5/13/2021      Healthcare Decision Maker:      Pt's daughter in room at the time of visit. She said pt has a document at home. She said she will bring pt's ACP document during her next visit.  gave a blank copy of AD document to pt's daughter as requested.         Saul Melgar

## 2021-05-14 NOTE — CONSULTS
In patient Neurology consult        [facility] Neurology      MD Tyra Amos  6/24/1925    Date of Service: 5/14/2021    Referring Physician: Nina Ramos MD    Most of the history was obtained from detailed chart reviewing and discussion with the patient's daughter . The patient is currently confused and unable to provide me with accurate history. Reason for the consult and CC: Acute confusion and possible stroke    HPI:   The patient is a 80y.o.  years old male with multiple medical problems who was admitted to the hospital yesterday with recent confusion and falling. He lives with his daughter. At baseline, he needs assistant in basic ADL. He has been acting confused over the last 2 to 3 days according to his daughter. He got up in the middle of the night confused and had some incontinence which is new. He was slurring his speech and not following direction. Degree was severe. He fell but no significant injury. Duration was seconds to minutes. Since that time, he has been having trouble with walking and confusion. No other relieving or aggravating factors. He came to the ED for evaluation. Initial neuroimaging showed no large stroke but possible left ICA dissection with C4 fracture. He was admitted to [facility]. Over the last 24 hours, has been waxing and waning with delirium. No other new symptoms could be obtained from the patient now. Other review of system was unremarkable.     Family History   Problem Relation Age of Onset    Cancer Mother     Cancer Father          Past Medical History:   Diagnosis Date    Arthritis     Atrial fibrillation ([address]     CVA (cerebral vascular accident) ([address]    [employer] Glaucoma     [place] (hard of hearing)     Hyperglycemia     Hyperlipidemia     Macular degeneration     left eye    Prostate cancer ([address] 2007    Venous insufficiency      Past Surgical History:   Procedure Laterality Date    CHOLECYSTECTOMY      GLAUCOMA SURGERY      HIP ARTHROPLASTY      bilateral    NOSE SURGERY       Social History     Tobacco Use    Smoking status: Never Smoker    Smokeless tobacco: Never Used   Substance Use Topics    Alcohol use: No     Alcohol/week: 0.0 standard drinks    Drug use: No     Allergies   Allergen Reactions    Brimonidine Tartrate      Other reaction(s): low heart rate    Demerol Swelling     Current Facility-Administered Medications   Medication Dose Route Frequency Provider Last Rate Last Admin    lidocaine 4 % external patch 1 patch  1 patch Transdermal Daily Cindy Allan MD   1 patch at 05/14/21 1603    perflutren lipid microspheres (DEFINITY) injection 1.65 mg  1.5 mL Intravenous ONCE PRN Cindy Allan MD        sodium chloride flush 0.9 % injection 10 mL  10 mL Intravenous 2 times per day Cindy Allan MD   10 mL at 05/14/21 0755    sodium chloride flush 0.9 % injection 10 mL  10 mL Intravenous PRN Cindy Allan MD        0.9 % sodium chloride infusion  25 mL Intravenous PRN Cindy Allan MD        senna (SENOKOT) tablet 8.6 mg  1 tablet Oral Daily PRN Cindy Allan MD        bisacodyl (DULCOLAX) suppository 10 mg  10 mg Rectal Daily PRN Cindy Allan MD        aspirin EC tablet 81 mg  81 mg Oral Daily Cindy Allan MD   81 mg at 05/14/21 0401    Or    aspirin suppository 300 mg  300 mg Rectal Daily Cindy Allan MD        atorvastatin (LIPITOR) tablet 20 mg  20 mg Oral Nightly Cindy Allan MD   20 mg at 05/13/21 2130    labetalol (NORMODYNE;TRANDATE) injection 10 mg  10 mg Intravenous Q10 Min PRN Cindy Allan MD        ondansetron (ZOFRAN-ODT) disintegrating tablet 4 mg  4 mg Oral Q8H PRN Cindy Allan MD        Or    ondansetron (ZOFRAN) injection 4 mg  4 mg Intravenous Q6H PRN Cindy Allan MD   4 mg at 05/14/21 1251       ROS: 10-14 system review was limited due to MS changes or as per HPI.      Constitutional:   Vitals:    05/14/21 0502 05/14/21 0751 05/14/21 1141 05/14/21 1445   BP:  (!) 153/94 (!) 153/95 (!) 136/90   Pulse:  88 84 95   Resp:  14 18    Temp:  97.5 °F (36.4 °C) 97.3 °F (36.3 °C)    TempSrc:  Axillary Axillary    SpO2:  97% 94% 92%   Weight: 158 lb 8.2 oz (71.9 kg)      Height:           General appearance: Confused   Eye: Fundus of the eye: Optic disc is difficult to obtain due to poor cooperation from the patient  Neck: supple  Cardiovascular: No lower leg edema with good pulsation. No carotid bruit. Mental Status:   AAO times one  Poor attention and concentration. Waxing and waning. Unable to assess recent or remote memory due to confusion  Language: Fluent but with poor comprehension and not following directions  Unable to assess fund of knowledge due to confusion. Cranial Nerves:   II: Visual fields: full. Pupils: equal, round, reactive to light  III,IV,VI: Extra Ocular Movements are intact. No nystagmus  V: Facial sensation : normal  VII: Facial strength and movements: intact and symmetric  VIII: Hearing: can track my voice  IX: Palate elevation symmetric   XI: Shoulder shrug: symmetric  XII: Tongue movements: midline  Musculoskeletal:  The patient can move all 4 extremities. Appears slightly weaker on the right compared to left    Tone: Normal tone. No rigidity. Reflexes: Symmetric 2+ in both arms and 2+ in the legs. Planters: flexor bilaterally. Coordination: No abnormal movement  Sensation: Patient can withdraw to pain from left and right   Gait/Posture: Cannot be tested due to poor cooperation with the patient.     Data:  LABS:   Lab Results   Component Value Date     05/14/2021    K 3.7 05/14/2021     05/14/2021    CO2 24 05/14/2021    BUN 12 05/14/2021    CREATININE 0.8 05/14/2021    GFRAA >60 05/14/2021    GFRAA >60 10/24/2012    LABGLOM >60 05/14/2021    GLUCOSE 118 05/14/2021    CALCIUM 9.6 05/14/2021     Lab Results   Component Value Date    WBC 10.1 05/14/2021    RBC 4.03 05/14/2021    HGB 12.7 05/14/2021    HCT 38.3 05/14/2021    MCV 95.2 05/14/2021    RDW 13.2 05/14/2021     05/14/2021     Lab Results   Component Value Date    INR 0.98 05/13/2021    PROTIME 11.4 05/13/2021       Neuroimaging were independently reviewed by me and discussed with his daughter. Reviewed notes from different physicians  Reviewed lab and blood testing    Impression:  Acute encephalopathy, severe. Could be related to the acute metabolic encephalopathy, recent fall and possible new ischemic stroke in setting of left ICA dissection. Not candidate for anticoagulation given advanced age and recurrent falling. Would recommend aspirin alone for now. Will need MRI of the brain to confirm new ischemic stroke. Hospital-acquired delirium  Advanced age  Recent fall with C4 fracture  Hypertension, not controlled      Recommendation:  MRI brain  Echo  PT and OT  Spinal cord and falling precautions  Telemetry  DVT and GI prophylaxis  Blood pressure monitor for now  Insulin sliding scale   Lipid panel  A1c  Aspirin  Statin  Lengthy discussion with his daughter today regarding risk of strokes, worsening delirium and dementia and risk of future falling. I am not sure if is safe to go back home. Would recommend DC planning and social work consultation  We will follow after MRI    MDM: High    Thank you for referring such patient. If you have any questions regarding my consult note, please don't hesitate to call me. Waqas Hawk MD  [phone]    This dictation was generated by voice recognition computer software.  Although all attempts are made to edit the dictation for accuracy, there may be errors in the  transcription that are not intended

## 2021-05-14 NOTE — FLOWSHEET NOTE
05/14/21 1442   Encounter Summary   Services provided to: Family   Continue Visiting   (5/14 AD document given to pt's daughter for pt)   Complexity of Encounter Low   Length of Encounter 15 minutes

## 2021-05-14 NOTE — PROGRESS NOTES
Physical Therapy    Facility/Department: [facility] C5 - MED SURG/ORTHO  Initial Assessment    NAME: Stefania Street  : 1925  MRN: [id number]    Date of Service: 2021    Discharge Recommendations:  Continue to assess pending progress   PT Equipment Recommendations  Equipment Needed: (CTA)  If pt is unable to be seen after this session, please let this note serve as discharge summary. Please see case management note for discharge disposition. Thank you. Assessment   Body structures, Functions, Activity limitations: Decreased functional mobility ; Decreased ADL status; Decreased strength;Decreased safe awareness;Decreased cognition;Decreased endurance;Decreased coordination;Decreased balance  Assessment: Pt functioning below baseline needing Ax2 and poor command following. Per chart review, pt was Mod I with ambulation using RW, lives with family. Admitted for AMS/confusion. During eval/tx, pt followed commands 50% of the time, no verbal communication or indication of yes/no. R lateral lean when sitting EOB. Was able to stand wtih Ax2 but unable to ambulate and assisted back to bed. Pt fell asleep quickly. Will CTA for DC recommendations. Likely will need SNF, may be appropriate for IP rehab if improved tolerance to activity. Treatment Diagnosis: decreased mobility  Prognosis: Good;Fair  Decision Making: Medium Complexity  PT Education: Goals; General Safety; Disease Specific Education;Orientation;PT Role  Patient Education: no evidence of learning at this time  Barriers to Learning: cognition  REQUIRES PT FOLLOW UP: Yes  Activity Tolerance  Activity Tolerance: Patient limited by fatigue;Patient limited by endurance; Patient limited by cognitive status  Activity Tolerance: BP: 136/90, 85 HR, 94% SpO2       Patient Diagnosis(es): There were no encounter diagnoses.      has a past medical history of Arthritis, Atrial fibrillation ([address] CVA (cerebral vascular accident) ([address] Glaucoma, [place] (hard of hearing), Poor;+(R lateral lean)  Sitting - Dynamic: Poor;+  Standing - Static: Poor  Standing - Dynamic: Poor        Plan   Plan  Times per week: 3-5x/week  Current Treatment Recommendations: Strengthening, Balance Training, Functional Mobility Training, Transfer Training, Gait Training, Patient/Caregiver Education & Training  Safety Devices  Type of devices:  All fall risk precautions in place, Bed alarm in place, Telesitter in use, Call light within reach, Gait belt, Patient at risk for falls, Left in bed, Nurse notified  Restraints  Initially in place: No      AM-PAC Score  AM-PAC Inpatient Mobility Raw Score : 9 (05/14/21 1516)  AM-PAC Inpatient T-Scale Score : 30.55 (05/14/21 1516)  Mobility Inpatient CMS 0-100% Score: 81.38 (05/14/21 1516)  Mobility Inpatient CMS G-Code Modifier : CM (05/14/21 1516)          Goals  Short term goals  Time Frame for Short term goals: 5/21  Short term goal 1: Pt will complete bed mobility with Min A  Short term goal 2: Pt will sit to stand wtih Min A  Short term goal 3: Pt will ambulate x 10' with RW with Mod A  Short term goal 4: Pt will participate in B LE Exercises to improve functional mobility and endurance by 5/17  Patient Goals   Patient goals : none stated       Therapy Time   Individual Concurrent Group Co-treatment   Time In 1420         Time Out 1453         Minutes 33         Timed Code Treatment Minutes: CRISTIAN Gallardo

## 2021-05-15 VITALS
DIASTOLIC BLOOD PRESSURE: 79 MMHG | WEIGHT: 159.39 LBS | OXYGEN SATURATION: 95 % | SYSTOLIC BLOOD PRESSURE: 148 MMHG | HEART RATE: 72 BPM | BODY MASS INDEX: 24.16 KG/M2 | RESPIRATION RATE: 20 BRPM | HEIGHT: 68 IN | TEMPERATURE: 98.2 F

## 2021-05-15 LAB
ESTIMATED AVERAGE GLUCOSE: 105.4 MG/DL
HBA1C MFR BLD: 5.3 %

## 2021-05-15 PROCEDURE — 99232 SBSQ HOSP IP/OBS MODERATE 35: CPT | Performed by: PSYCHIATRY & NEUROLOGY

## 2021-05-15 PROCEDURE — 6370000000 HC RX 637 (ALT 250 FOR IP): Performed by: HOSPITALIST

## 2021-05-15 PROCEDURE — 6370000000 HC RX 637 (ALT 250 FOR IP)

## 2021-05-15 PROCEDURE — 2580000003 HC RX 258: Performed by: HOSPITALIST

## 2021-05-15 RX ADMIN — DEXTROSE AND SODIUM CHLORIDE: 5; 450 INJECTION, SOLUTION INTRAVENOUS at 11:09

## 2021-05-15 RX ADMIN — Medication: at 14:07

## 2021-05-15 ASSESSMENT — PAIN SCALES - PAIN ASSESSMENT IN ADVANCED DEMENTIA (PAINAD)
TOTALSCORE: 0
FACIALEXPRESSION: 0
BREATHING: 0
BREATHING: 0
CONSOLABILITY: 0
CONSOLABILITY: 0
BODYLANGUAGE: 0
TOTALSCORE: 0
NEGVOCALIZATION: 0
CONSOLABILITY: 0
FACIALEXPRESSION: 0
TOTALSCORE: 0
TOTALSCORE: 0
FACIALEXPRESSION: 0
NEGVOCALIZATION: 0
BREATHING: 0
TOTALSCORE: 0
BODYLANGUAGE: 0
CONSOLABILITY: 0
CONSOLABILITY: 0
BODYLANGUAGE: 0
BODYLANGUAGE: 0
NEGVOCALIZATION: 0
TOTALSCORE: 0
BODYLANGUAGE: 0
BREATHING: 0
BODYLANGUAGE: 0

## 2021-05-15 NOTE — PROGRESS NOTES
Nelta Guard  Neurology Follow-up  [facility] Neurology    Date of Service: 5/15/2021    Subjective:   CC: Follow up today regarding: Acute encephalopathy and new stroke    Events noted. Chart and lab reviewed. The patient had his MRI which showed left hemispheric stroke more EDGARD distribution. He looks worse compared to yesterday. More obtunded and waxing and waning. Family decided to go with hospice consultation. ROS : A 10-12 system review could not be obtained due to poor cooperation and confusion. family history includes Cancer in his father and mother.     Past Medical History:   Diagnosis Date    Arthritis     Atrial fibrillation ([address]     CVA (cerebral vascular accident) ([address]     Glaucoma     [place] (hard of hearing)     Hyperglycemia     Hyperlipidemia     Macular degeneration     left eye    Prostate cancer ([address] 2007    Venous insufficiency      Current Facility-Administered Medications   Medication Dose Route Frequency Provider Last Rate Last Admin    dextrose 5 % and 0.45 % NaCl 1,000 mL infusion   Intravenous Continuous Bo Poole MD 80 mL/hr at 05/15/21 1109 New Bag at 05/15/21 1109    Lip Balm ointment             lidocaine 4 % external patch 1 patch  1 patch Transdermal Daily Gill Hewitt MD   1 patch at 05/15/21 1145    perflutren lipid microspheres (DEFINITY) injection 1.65 mg  1.5 mL Intravenous ONCE PRN Gill Hewitt MD        sodium chloride flush 0.9 % injection 10 mL  10 mL Intravenous 2 times per day Gill Hewitt MD   10 mL at 05/14/21 2033    sodium chloride flush 0.9 % injection 10 mL  10 mL Intravenous PRN Gill Hewitt MD        0.9 % sodium chloride infusion  25 mL Intravenous PRN Gill Hewitt MD        senna (SENOKOT) tablet 8.6 mg  1 tablet Oral Daily PRN Gill Hewitt MD        bisacodyl (DULCOLAX) suppository 10 mg  10 mg Rectal Daily PRN Gill Hewitt MD        aspirin EC tablet 81 mg  81 mg Oral Daily Gill Hewitt MD   81 mg at 05/14/21 0756    Or    aspirin suppository 300 mg  300 mg Rectal Daily Arlni Pereira MD        atorvastatin (LIPITOR) tablet 20 mg  20 mg Oral Nightly Arlin Pereira MD   20 mg at 05/13/21 2130    labetalol (NORMODYNE;TRANDATE) injection 10 mg  10 mg Intravenous Q10 Min PRN Arlin Pereira MD        ondansetron (ZOFRAN-ODT) disintegrating tablet 4 mg  4 mg Oral Q8H PRN Arlin Pereira MD        Or    ondansetron [facility]) injection 4 mg  4 mg Intravenous Q6H PRN Arlin Pereira MD   4 mg at 05/14/21 1251     Allergies   Allergen Reactions    Brimonidine Tartrate      Other reaction(s): low heart rate    Demerol Swelling      reports that he has never smoked. He has never used smokeless tobacco. He reports that he does not drink alcohol and does not use drugs. Objective:  Constitutional:   Vitals:    05/14/21 2318 05/15/21 0429 05/15/21 0433 05/15/21 0841   BP: (!) 143/74 (!) 177/78  130/81   Pulse: 83 90  95   Resp: 18 18  18   Temp: 97.7 °F (36.5 °C) 98 °F (36.7 °C)  97.4 °F (36.3 °C)   TempSrc: Oral Axillary  Axillary   SpO2: 93% 92%  91%   Weight:   159 lb 6.3 oz (72.3 kg)    Height:           General appearance: No acute distress  Mental Status:   AAO x1  Waxing and waning  Does not follow direction  Poor attention  Impaired memory and fund of knowledge  Cranial Nerves:   II: Pupils: equal, round, reactive to light  III,IV,VI: No gaze preference. No nystagmus  VII: Facial strength and movements: intact and symmetric  XII: Tongue movements : midline  Musculoskeletal: He can move all 4 extremity but appears weaker on his right side more right leg and left hip. No response to painful stimulation today  Unable to assess cerebellar gait.     Data:  LABS:   Lab Results   Component Value Date     05/14/2021    K 3.7 05/14/2021     05/14/2021    CO2 24 05/14/2021    BUN 12 05/14/2021    CREATININE 0.8 05/14/2021    GFRAA >60 05/14/2021    GFRAA >60 10/24/2012    LABGLOM >60 05/14/2021    GLUCOSE 118 05/14/2021    CALCIUM 9.6 05/14/2021     Lab Results   Component Value Date    WBC 10.1 05/14/2021    RBC 4.03 05/14/2021    HGB 12.7 05/14/2021    HCT 38.3 05/14/2021    MCV 95.2 05/14/2021    RDW 13.2 05/14/2021     05/14/2021     Lab Results   Component Value Date    INR 0.98 05/13/2021    PROTIME 11.4 05/13/2021       Neuroimaging and labs were independently reviewed by me  Reviewed notes from different physicians. Impression:  Acute encephalopathy, severe. Likely multifactorial metabolic encephalopathy in addition to new left hemispheric CVA. ICA dissection  History of PAF  Recent fall with C4 fracture  Advanced age  Hypertension      Recommendation  MRI results discussed with his daughter  Family decided to go with hospice giving persistent delirium and encephalopathy and poor functional outcome  Continue current supportive care  Hydration  Aspirin  Pain control  Nothing to add from neurology  We will sign off           Waqas Hawk MD   [phone]      This dictation was generated by voice recognition computer software. Although all attempts are made to edit the dictation for accuracy, there may be errors in the transcription that are not intended.

## 2021-05-15 NOTE — PROGRESS NOTES
Speech Language Pathology  DE Attempt    SLP reviewed chart and spoke with Kelly Montez, who ok'd entry of SLP. Pt briefly awake to SLP, eyes opened a bit, however, pt unable to follow basic commands despite max cues from SLP. Please contact SLP if pt becomes more awake later this date. Will re-attempt as pt is appropriate. Thank you!     Gretchen Stephens, [address], [facility]  Speech-Language Pathologist

## 2021-05-15 NOTE — PROGRESS NOTES
Hospitalist Progress Note      PCP: Bentley Myers MD    Date of Admission: 5/13/2021        Subjective:     Patient remains drowsy but arousable, nonverbal.. Has been pocketing food in his mouth and is now n.p.o.. [facility] Records Medications:  Reviewed    Infusion Medications    D5W + 0.45% NaCl 1000 ml + Additives 80 mL/hr at 05/15/21 1109    sodium chloride       Scheduled Medications    lidocaine  1 patch Transdermal Daily    sodium chloride flush  10 mL Intravenous 2 times per day    aspirin  81 mg Oral Daily    Or    aspirin  300 mg Rectal Daily    atorvastatin  20 mg Oral Nightly     PRN Meds: perflutren lipid microspheres, sodium chloride flush, sodium chloride, senna, bisacodyl, labetalol, ondansetron **OR** ondansetron      Intake/Output Summary (Last 24 hours) at 5/15/2021 1545  Last data filed at 5/14/2021 1755  Gross per 24 hour   Intake 0 ml   Output --   Net 0 ml       Exam:    BP (!) 156/82   Pulse 84   Temp 97.2 °F (36.2 °C) (Axillary)   Resp 18   Ht 5' 8\" (1.727 m)   Wt 159 lb 6.3 oz (72.3 kg)   SpO2 96%   BMI 24.24 kg/m²     General appearance: No apparent distress, nonverbal.  HEENT: Pupils equal, round, and reactive to light. Conjunctivae/corneas clear. Neck: Supple, with full range of motion. No jugular venous distention. Trachea midline. Respiratory:  Normal respiratory effort. Clear to auscultation, bilaterally without Rales/Wheezes/Rhonchi. Cardiovascular: Regular rate and rhythm with normal S1/S2 without murmurs, rubs or gallops. Abdomen: Soft, non-tender, non-distended with normal bowel sounds. Musculoskeletal: No clubbing, cyanosis or edema bilaterally. Full range of motion without deformity. Skin: Skin color, texture, turgor normal.  No rashes or lesions.   Neurologic: Nonfocal, no obvious focal deficits  Capillary Refill: Brisk,< 3 seconds   Peripheral Pulses: +2 palpable, equal bilaterally       Labs:   Recent Labs     05/13/21  0055 05/14/21  0611   WBC 8.2

## 2021-05-15 NOTE — CARE COORDINATION
Patient to discharge to [facility] inpatient unit. [facility] to transport at [facility]. All arrangement through [facility], RN.

## 2021-05-15 NOTE — DISCHARGE SUMMARY
(72.3 kg)   SpO2 96%   BMI 24.24 kg/m²   Gen/overall appearance: Not in acute distress. Alert. Head: Normocephalic, atraumatic  Eyes: EOMI, good acuity  ENT:- Oral mucosa moist  Neck: No JVD, thyromegaly  CVS: Nml S1S2, no MRG, RRR  Pulm: Clear bilaterally. No crackles/wheezes  Gastrointestinal: Soft, NT/ND, +BS  Musculoskeletal: No edema. Warm  Neuro: No focal deficit. Moves extremity spontaneously. Psychiatry: Appropriate affect. Not agitated. Skin: Warm, dry with normal turgor. No rash        Significant Diagnostic Studies:     see above        Treatments: As above. Discharge Medications:     Medication List      STOP taking these medications    acetaminophen 500 MG tablet  Commonly known as: TYLENOL     CENTRUM SILVER PO     fexofenadine 180 MG tablet  Commonly known as: ALLEGRA     VITEYES AREDS FORMULA PO            Activity: activity as tolerated  Diet: Diet NPO Effective Now      Disposition: Inpatient hospice  Discharged Condition: Stable  Follow Up:   No follow-up provider specified. Code status:  DNR-CC         Total time spent on discharge, finalizing medications, referrals and arranging outpatient follow up was more than 45 minutes      Thank you Dr. Bentley Myers MD for the opportunity to be involved in this patients care.

## 2021-05-15 NOTE — DISCHARGE INSTR - COC
Cerebral infarction (HCC) I63.9    Glaucoma H40.9    Arthritis M19.90    Prostate cancer ([facility] C61    [place] (hard of hearing) H91.90    Venous insufficiency I87.2    Hyperglycemia R73.9    Left-sided low back pain with left-sided sciatica M54.42    Edema of both legs R60.0    Chronic atrial fibrillation ([facility]) I48.20    Fx humeral neck, left, closed, initial encounter S42.212A    Acute pain of left shoulder M25.512    Decreased proprioception of joint of foot, left M25.872    Foot drop, left M21.372    Elevated BP without diagnosis of hypertension R03.0    Chronic cough R05    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    Altered mental state R41.82    Chronic cerebral ischemia I67.82    Compression fracture of C4 vertebra ([facility]) S12.390A    History of cerebrovascular accident (CVA) in adulthood Z86.73    Encephalopathy, metabolic L44.79    Arterial ischemic stroke, ICA, left, acute ([facility]) Y64.324    HTN (hypertension), benign I10       Isolation/Infection:   Isolation            No Isolation          Patient Infection Status       None to display            Nurse Assessment:  Last Vital Signs: BP (!) 156/82   Pulse 84   Temp 97.2 °F (36.2 °C) (Axillary)   Resp 18   Ht 5' 8\" (1.727 m)   Wt 159 lb 6.3 oz (72.3 kg)   SpO2 96%   BMI 24.24 kg/m²     Last documented pain score (0-10 scale):    Last Weight:   Wt Readings from Last 1 Encounters:   05/15/21 159 lb 6.3 oz (72.3 kg)     Mental Status:  ZAIN- patient follows some comands, mostly non verbal    IV Access:  - None    Nursing Mobility/ADLs:  Walking   Dependent  Transfer  Dependent  Bathing  Dependent  Dressing  Dependent  Toileting  Dependent  Feeding  NPO  Med Admin  NPO  Med Delivery    NPO    Wound Care Documentation and Therapy:        Elimination:  Continence:   · Bowel: No  · Bladder: No  Urinary Catheter: none  Colostomy/Ileostomy/Ileal Conduit: No       Date of Last BM: ***    Intake/Output Summary (Last 24 hours) at 5/15/2021 [address] filed at 5/14/2021 8555  Gross per 24 hour   Intake 0 ml   Output --   Net 0 ml     No intake/output data recorded. Safety Concerns: At Risk for Falls and Aspiration Risk    Impairments/Disabilities:      Speech    Nutrition Therapy:  Current Nutrition Therapy:   - NPO    Routes of Feeding: None  Liquids: No Liquids  Daily Fluid Restriction: no  Last Modified Barium Swallow with Video (Video Swallowing Test): not done    Treatments at the Time of Hospital Discharge:   Respiratory Treatments: ***  Oxygen Therapy:  is not on home oxygen therapy. Ventilator:    - No ventilator support    Rehab Therapies: none  Weight Bearing Status/Restrictions: No weight bearing restirctions  Other Medical Equipment (for information only, NOT a DME order): Other Treatments: ***    Patient's personal belongings (please select all that are sent with patient):  None    RN SIGNATURE:  Carolina Lopez RN     CASE MANAGEMENT/SOCIAL WORK SECTION    Inpatient Status Date: ***    Readmission Risk Assessment Score:  Readmission Risk              Risk of Unplanned Readmission:  10           Discharging to Facility/ Agency   · Name:   · Address:  · Phone:  · Fax:    Dialysis Facility (if applicable)   · Name:  · Address:  · Dialysis Schedule:  · Phone:  · Fax:    / signature: {Esignature:[id number]:::0}    PHYSICIAN SECTION    Prognosis: Poor    Condition at Discharge: Terminal    Rehab Potential (if transferring to Rehab): Poor    Recommended Labs or Other Treatments After Discharge:     Physician Certification: I certify the above information and transfer of Claude Dorado  is necessary for the continuing treatment of the diagnosis listed and that he requires Hospice for greater 30 days.      Update Admission H&P: No change in H&P    PHYSICIAN SIGNATURE:  Electronically signed by Mellisa Vasquez MD on 5/15/21 at 4:28 PM EDT

## 2021-05-15 NOTE — CARE COORDINATION
CM notes hospice order, spoke to [facility] and pt requesting to speak to SW. Met with pt's daughter Jada Basilio outside of pt's room. Jada Basilio asking about [place] to [facility] with hospice in place. CM updated daughter that pt not having skilled need that insurance will not cover SNF stay. Pt does not have medicaid and family would have to pay out of pocket. Daughter wanting  referral to [facility] [address]. Referral faxed, called and spoke to Melanie Anderson with [address] to confirm receipt. CM following-Nitza Cedillo, RN     ADDENDUM 1334: CM spoke to Natasha Chacon with [address] and updated that she did talk to pt's daughter Jada Basilio on the phone and meeting is arranged today at 56 with family here at [address] is Carito Kapoor that will meet with family. Updated Ganga Otero RN.  CM following-Nitza Cedillo, RN

## 2021-05-15 NOTE — FLOWSHEET NOTE
05/15/21 1541   Encounter Summary   Services provided to: Patient and family together   Referral/Consult From: [facility] Family members;Friends/neighbors   Continue Visiting   (5/15 follow up, sppt and prayer w/ Pt's friend)   Complexity of Encounter Moderate   Length of Encounter 15 minutes   Grief and Life Adjustment   Type Hospice   Assessment Calm; Approachable;Passive   Intervention Active listening;Explored feelings, thoughts, concerns;Prayer   Outcome Expressed gratitude;Engaged in conversation;Expressed feelings/needs/concerns